# Patient Record
Sex: FEMALE | Race: WHITE | NOT HISPANIC OR LATINO | Employment: OTHER | URBAN - METROPOLITAN AREA
[De-identification: names, ages, dates, MRNs, and addresses within clinical notes are randomized per-mention and may not be internally consistent; named-entity substitution may affect disease eponyms.]

---

## 2018-06-11 ENCOUNTER — HOSPITAL ENCOUNTER (EMERGENCY)
Facility: HOSPITAL | Age: 43
Discharge: HOME | End: 2018-06-11
Attending: EMERGENCY MEDICINE

## 2018-06-11 VITALS
WEIGHT: 171 LBS | SYSTOLIC BLOOD PRESSURE: 151 MMHG | BODY MASS INDEX: 25.91 KG/M2 | TEMPERATURE: 99 F | DIASTOLIC BLOOD PRESSURE: 95 MMHG | OXYGEN SATURATION: 97 % | HEIGHT: 68 IN | HEART RATE: 82 BPM | RESPIRATION RATE: 18 BRPM

## 2018-06-11 DIAGNOSIS — T63.301A SPIDER BITE WOUND, ACCIDENTAL OR UNINTENTIONAL, INITIAL ENCOUNTER: Primary | ICD-10-CM

## 2018-06-11 LAB
ANION GAP SERPL CALC-SCNC: 7 MMOL/L
BASOPHILS # BLD AUTO: 0.03 K/UL
BASOPHILS NFR BLD: 0.4 %
BUN SERPL-MCNC: 9 MG/DL
CALCIUM SERPL-MCNC: 9.3 MG/DL
CHLORIDE SERPL-SCNC: 107 MMOL/L
CO2 SERPL-SCNC: 24 MMOL/L
CREAT SERPL-MCNC: 0.7 MG/DL
DEPRECATED S PYO AG THROAT QL EIA: NEGATIVE
DIFFERENTIAL METHOD: ABNORMAL
EOSINOPHIL # BLD AUTO: 0.3 K/UL
EOSINOPHIL NFR BLD: 3.7 %
ERYTHROCYTE [DISTWIDTH] IN BLOOD BY AUTOMATED COUNT: 12.9 %
EST. GFR  (AFRICAN AMERICAN): >60 ML/MIN/1.73 M^2
EST. GFR  (NON AFRICAN AMERICAN): >60 ML/MIN/1.73 M^2
GLUCOSE SERPL-MCNC: 100 MG/DL
HCT VFR BLD AUTO: 35.2 %
HGB BLD-MCNC: 11.8 G/DL
IMM GRANULOCYTES # BLD AUTO: 0.04 K/UL
IMM GRANULOCYTES NFR BLD AUTO: 0.6 %
LYMPHOCYTES # BLD AUTO: 2 K/UL
LYMPHOCYTES NFR BLD: 27.4 %
MCH RBC QN AUTO: 31.4 PG
MCHC RBC AUTO-ENTMCNC: 33.5 G/DL
MCV RBC AUTO: 94 FL
MONOCYTES # BLD AUTO: 0.5 K/UL
MONOCYTES NFR BLD: 6.6 %
NEUTROPHILS # BLD AUTO: 4.4 K/UL
NEUTROPHILS NFR BLD: 61.3 %
NRBC BLD-RTO: 0 /100 WBC
PLATELET # BLD AUTO: 244 K/UL
PMV BLD AUTO: 10.3 FL
POTASSIUM SERPL-SCNC: 4 MMOL/L
RBC # BLD AUTO: 3.76 M/UL
SODIUM SERPL-SCNC: 138 MMOL/L
WBC # BLD AUTO: 7.11 K/UL

## 2018-06-11 PROCEDURE — 85025 COMPLETE CBC W/AUTO DIFF WBC: CPT

## 2018-06-11 PROCEDURE — 87081 CULTURE SCREEN ONLY: CPT

## 2018-06-11 PROCEDURE — 99284 EMERGENCY DEPT VISIT MOD MDM: CPT

## 2018-06-11 PROCEDURE — 80048 BASIC METABOLIC PNL TOTAL CA: CPT

## 2018-06-11 PROCEDURE — 36415 COLL VENOUS BLD VENIPUNCTURE: CPT

## 2018-06-11 PROCEDURE — 87880 STREP A ASSAY W/OPTIC: CPT

## 2018-06-11 RX ORDER — PREDNISONE 10 MG/1
30 TABLET ORAL DAILY
Qty: 9 TABLET | Refills: 0 | Status: SHIPPED | OUTPATIENT
Start: 2018-06-11 | End: 2018-06-14

## 2018-06-11 RX ORDER — SULFAMETHOXAZOLE AND TRIMETHOPRIM 800; 160 MG/1; MG/1
1 TABLET ORAL 2 TIMES DAILY
Qty: 14 TABLET | Refills: 0 | Status: SHIPPED | OUTPATIENT
Start: 2018-06-11 | End: 2018-06-18

## 2018-06-11 RX ORDER — CLONAZEPAM 1 MG/1
1 TABLET ORAL 2 TIMES DAILY PRN
COMMUNITY
End: 2019-03-16

## 2018-06-11 RX ORDER — HYDROCODONE BITARTRATE AND ACETAMINOPHEN 5; 325 MG/1; MG/1
1-2 TABLET ORAL EVERY 6 HOURS PRN
Qty: 12 TABLET | Refills: 0 | Status: SHIPPED | OUTPATIENT
Start: 2018-06-11 | End: 2019-01-09

## 2018-06-11 NOTE — ED NOTES
"Pt sitting in ER bed 10 with c/o "spider bites" to R elbow and chest. Pt reports the bite occurred 3 days ago, she has been taking Keflex that she had left over from a prior prescription and Bactroban ointment. Pt now reports she has a productive cough, sore throat, and fever. On arrival to ER pt did not have a fever. Respirations equal and unlabored. Pt updated on plan of care. Will continue to monitor.   "

## 2018-06-12 NOTE — ED PROVIDER NOTES
Encounter Date: 6/11/2018       History     Chief Complaint   Patient presents with    Insect Bite     on right elbow and right upper chest     Two reported bites from spider after retrieving items from attic   Time of bite now 2-3 days ago  One right medial supratrochlear  One to the right of the sternal notch.    No fever  Reports ongoing pain significant in spite of basic care            Review of patient's allergies indicates:   Allergen Reactions    Tetracyclines Anaphylaxis    Vancomycin analogues Anaphylaxis    Iodine and iodide containing products     Morphine     Reglan [metoclopramide hcl]     Toradol [ketorolac]      Past Medical History:   Diagnosis Date    Anxiety      History reviewed. No pertinent surgical history.  History reviewed. No pertinent family history.  Social History   Substance Use Topics    Smoking status: Current Every Day Smoker    Smokeless tobacco: Never Used    Alcohol use No     Review of Systems   Constitutional: Negative.    Respiratory: Negative.    Cardiovascular: Negative.    Musculoskeletal: Negative.    Skin: Positive for wound. Negative for rash.   Hematological: Negative.    All other systems reviewed and are negative.      Physical Exam     Initial Vitals [06/11/18 1004]   BP Pulse Resp Temp SpO2   (!) 151/95 82 18 98.5 °F (36.9 °C) 97 %      MAP       113.67         Physical Exam    Nursing note and vitals reviewed.  Constitutional: She appears well-developed and well-nourished.   HENT:   Head: Normocephalic and atraumatic.   Mouth/Throat: Oropharynx is clear and moist.   Neck: Normal range of motion. Neck supple.   Cardiovascular: Normal rate, regular rhythm and normal heart sounds.   Pulmonary/Chest: Breath sounds normal.   Abdominal: There is no tenderness.   Musculoskeletal: Normal range of motion. She exhibits no edema or tenderness.   Skin: Skin is warm and dry. Capillary refill takes less than 2 seconds. No ecchymosis, no rash and no abscess noted. There  is erythema.        Psychiatric: She has a normal mood and affect.         ED Course   Procedures  Labs Reviewed   BASIC METABOLIC PANEL - Abnormal; Notable for the following:        Result Value    Anion Gap 7 (*)     All other components within normal limits   CBC W/ AUTO DIFFERENTIAL - Abnormal; Notable for the following:     RBC 3.76 (*)     Hemoglobin 11.8 (*)     Hematocrit 35.2 (*)     MCH 31.4 (*)     Immature Granulocytes 0.6 (*)     All other components within normal limits   THROAT SCREEN, RAPID   CULTURE, STREP A,  THROAT          No orders to display        Medical Decision Making:   Stable to DC as per AVS  Understands plan of care and if these represent recluse (not likely) that efforts to treat are typically unsuccessful  She has had prior recluse bite of the right pretibial area and understands difficulty in treating  She is agreeable to treat as per AVS                       Clinical Impression:   The encounter diagnosis was Spider bite wound, accidental or unintentional, initial encounter.                             Herbert José MD  06/12/18 2857

## 2018-06-13 LAB — BACTERIA THROAT CULT: NORMAL

## 2018-07-06 ENCOUNTER — HOSPITAL ENCOUNTER (EMERGENCY)
Facility: HOSPITAL | Age: 43
Discharge: HOME | End: 2018-07-06
Attending: FAMILY MEDICINE

## 2018-07-06 VITALS
HEIGHT: 68 IN | RESPIRATION RATE: 18 BRPM | DIASTOLIC BLOOD PRESSURE: 115 MMHG | OXYGEN SATURATION: 98 % | TEMPERATURE: 98 F | WEIGHT: 181 LBS | HEART RATE: 82 BPM | BODY MASS INDEX: 27.43 KG/M2 | SYSTOLIC BLOOD PRESSURE: 130 MMHG

## 2018-07-06 DIAGNOSIS — N93.8 DUB (DYSFUNCTIONAL UTERINE BLEEDING): Primary | ICD-10-CM

## 2018-07-06 LAB
ABO + RH BLD: NORMAL
ALBUMIN SERPL BCP-MCNC: 4 G/DL
ALP SERPL-CCNC: 54 U/L
ALT SERPL W/O P-5'-P-CCNC: 18 U/L
ANION GAP SERPL CALC-SCNC: 8 MMOL/L
AST SERPL-CCNC: 20 U/L
BASOPHILS # BLD AUTO: 0.03 K/UL
BASOPHILS NFR BLD: 0.4 %
BILIRUB SERPL-MCNC: 0.2 MG/DL
BLD GP AB SCN CELLS X3 SERPL QL: NORMAL
BUN SERPL-MCNC: 16 MG/DL
CALCIUM SERPL-MCNC: 9.4 MG/DL
CHLORIDE SERPL-SCNC: 109 MMOL/L
CO2 SERPL-SCNC: 23 MMOL/L
CREAT SERPL-MCNC: 0.6 MG/DL
DIFFERENTIAL METHOD: ABNORMAL
EOSINOPHIL # BLD AUTO: 0.3 K/UL
EOSINOPHIL NFR BLD: 4.3 %
ERYTHROCYTE [DISTWIDTH] IN BLOOD BY AUTOMATED COUNT: 13.4 %
EST. GFR  (AFRICAN AMERICAN): >60 ML/MIN/1.73 M^2
EST. GFR  (NON AFRICAN AMERICAN): >60 ML/MIN/1.73 M^2
GLUCOSE SERPL-MCNC: 96 MG/DL
HCT VFR BLD AUTO: 38.3 %
HGB BLD-MCNC: 12.9 G/DL
IMM GRANULOCYTES # BLD AUTO: 0.02 K/UL
IMM GRANULOCYTES NFR BLD AUTO: 0.3 %
INR PPP: 1
LYMPHOCYTES # BLD AUTO: 2.2 K/UL
LYMPHOCYTES NFR BLD: 29.1 %
MCH RBC QN AUTO: 31.3 PG
MCHC RBC AUTO-ENTMCNC: 33.7 G/DL
MCV RBC AUTO: 93 FL
MONOCYTES # BLD AUTO: 0.8 K/UL
MONOCYTES NFR BLD: 10.1 %
NEUTROPHILS # BLD AUTO: 4.2 K/UL
NEUTROPHILS NFR BLD: 55.8 %
NRBC BLD-RTO: 0 /100 WBC
PLATELET # BLD AUTO: 247 K/UL
PMV BLD AUTO: 10 FL
POTASSIUM SERPL-SCNC: 3.9 MMOL/L
PROT SERPL-MCNC: 7.3 G/DL
PROTHROMBIN TIME: 11.6 SEC
RBC # BLD AUTO: 4.12 M/UL
SODIUM SERPL-SCNC: 140 MMOL/L
WBC # BLD AUTO: 7.46 K/UL

## 2018-07-06 PROCEDURE — 36415 COLL VENOUS BLD VENIPUNCTURE: CPT

## 2018-07-06 PROCEDURE — 96372 THER/PROPH/DIAG INJ SC/IM: CPT

## 2018-07-06 PROCEDURE — 63600175 PHARM REV CODE 636 W HCPCS: Performed by: FAMILY MEDICINE

## 2018-07-06 PROCEDURE — 80053 COMPREHEN METABOLIC PANEL: CPT

## 2018-07-06 PROCEDURE — 86901 BLOOD TYPING SEROLOGIC RH(D): CPT

## 2018-07-06 PROCEDURE — 99283 EMERGENCY DEPT VISIT LOW MDM: CPT | Mod: 25

## 2018-07-06 PROCEDURE — 85025 COMPLETE CBC W/AUTO DIFF WBC: CPT

## 2018-07-06 PROCEDURE — 85610 PROTHROMBIN TIME: CPT

## 2018-07-06 RX ORDER — MEDROXYPROGESTERONE ACETATE 10 MG/1
10 TABLET ORAL DAILY
Qty: 10 TABLET | Refills: 0 | Status: SHIPPED | OUTPATIENT
Start: 2018-07-06 | End: 2019-03-16

## 2018-07-06 RX ORDER — PROMETHAZINE HYDROCHLORIDE 25 MG/ML
25 INJECTION, SOLUTION INTRAMUSCULAR; INTRAVENOUS
Status: COMPLETED | OUTPATIENT
Start: 2018-07-06 | End: 2018-07-06

## 2018-07-06 RX ORDER — HYDROMORPHONE HYDROCHLORIDE 2 MG/ML
1 INJECTION, SOLUTION INTRAMUSCULAR; INTRAVENOUS; SUBCUTANEOUS
Status: COMPLETED | OUTPATIENT
Start: 2018-07-06 | End: 2018-07-06

## 2018-07-06 RX ORDER — CLONIDINE HYDROCHLORIDE 0.1 MG/1
0.1 TABLET ORAL DAILY
COMMUNITY
End: 2020-08-12

## 2018-07-06 RX ORDER — ONDANSETRON 4 MG/1
4 TABLET, FILM COATED ORAL EVERY 6 HOURS
Qty: 12 TABLET | Refills: 0 | Status: SHIPPED | OUTPATIENT
Start: 2018-07-06 | End: 2019-03-16

## 2018-07-06 RX ADMIN — HYDROMORPHONE HYDROCHLORIDE 1 MG: 2 INJECTION, SOLUTION INTRAMUSCULAR; INTRAVENOUS; SUBCUTANEOUS at 09:07

## 2018-07-06 RX ADMIN — PROMETHAZINE HYDROCHLORIDE 25 MG: 25 INJECTION INTRAMUSCULAR; INTRAVENOUS at 09:07

## 2018-07-06 NOTE — ED PROVIDER NOTES
Encounter Date: 2018       History     Chief Complaint   Patient presents with    Vaginal Bleeding     Began approximately 15 days ago. States also passing larged sized blood clots.    Abdominal Pain     Complaint of constant pain to the bilateral lower abdominal quadrants. Describes pain to radiate into the pelvis and lower back. Describes pain to be a aching/cramping in nature.    Headache    Fatigue     Patient is a very pleasant 43-year-old lady with a history of fibromyalgia but no other medical problems.  She has had vaginal bleeding, that is quite heavy, passing clots for the past 15 days.  She states over the past 2 days she has started to become symptomatic with poor sleep, headache, fatigue and some mild shortness of breath and dyspnea on exertion.  She is typically a very active person.  She states her sister went into menopause at 42.          Review of patient's allergies indicates:   Allergen Reactions    Morphine Anaphylaxis    Tetracyclines Anaphylaxis    Vancomycin analogues Anaphylaxis    Iodine and iodide containing products Hives, Itching and Nausea And Vomiting    Toradol [ketorolac] Nausea And Vomiting and Hallucinations    Reglan [metoclopramide hcl] Rash     Past Medical History:   Diagnosis Date    Anxiety     Fibromyalgia     Migraine      Past Surgical History:   Procedure Laterality Date    APPENDECTOMY       SECTION      CHOLECYSTECTOMY      KNEE SURGERY Right     SHOULDER SURGERY Left      No family history on file.  Social History   Substance Use Topics    Smoking status: Current Every Day Smoker     Types: Vaping w/o nicotine    Smokeless tobacco: Never Used    Alcohol use Yes      Comment: Social     Review of Systems   Constitutional: Positive for fatigue. Negative for chills and fever.   HENT: Negative for congestion, ear pain, rhinorrhea, sinus pain, sinus pressure and sore throat.    Eyes: Negative for photophobia and redness.   Respiratory:  Positive for shortness of breath. Negative for cough and wheezing.    Cardiovascular: Negative for chest pain, palpitations and leg swelling.   Gastrointestinal: Negative for abdominal pain, constipation, diarrhea and nausea.   Endocrine: Negative for polydipsia, polyphagia and polyuria.   Genitourinary: Negative for difficulty urinating, dysuria, flank pain, hematuria and urgency.   Musculoskeletal: Negative for arthralgias, back pain and joint swelling.   Skin: Negative for color change.   Neurological: Positive for light-headedness. Negative for dizziness, seizures, syncope, weakness and headaches.   Hematological: Negative for adenopathy.   Psychiatric/Behavioral: Negative for sleep disturbance and suicidal ideas.   All other systems reviewed and are negative.      Physical Exam     Initial Vitals [07/06/18 0748]   BP Pulse Resp Temp SpO2   (!) 130/115 82 18 98.3 °F (36.8 °C) 98 %      MAP       --         Physical Exam    Nursing note and vitals reviewed.  Constitutional: Vital signs are normal. She appears well-developed and well-nourished.   HENT:   Head: Normocephalic and atraumatic.   Eyes: Lids are normal.   Neck: Trachea normal, normal range of motion and phonation normal. Neck supple.   Cardiovascular: Normal rate, regular rhythm, normal heart sounds, intact distal pulses and normal pulses.   Abdominal: Soft. Normal appearance and bowel sounds are normal.   Musculoskeletal: Normal range of motion.   Neurological: She is alert. GCS eye subscore is 4. GCS verbal subscore is 5. GCS motor subscore is 6.   Skin: Skin is warm, dry and intact. Capillary refill takes less than 2 seconds.   Psychiatric: She has a normal mood and affect. Her speech is normal and behavior is normal. Judgment and thought content normal. Cognition and memory are normal.         ED Course   Procedures  Labs Reviewed   CBC W/ AUTO DIFFERENTIAL   COMPREHENSIVE METABOLIC PANEL   PROTIME-INR   TYPE & SCREEN          Imaging Results     None          Medical Decision Making:   Clinical Tests:   Lab Tests: Ordered and Reviewed  The following lab test(s) were unremarkable: CBC, CMP, Urinalysis and PT  Labs are all unremarkable.  Will treat patient with Provera and pain medicine.                      Clinical Impression:   The encounter diagnosis was DUB (dysfunctional uterine bleeding).                             Ania Inman MD  07/07/18 0687

## 2018-12-14 ENCOUNTER — OCCUPATIONAL HEALTH (OUTPATIENT)
Dept: URGENT CARE | Facility: CLINIC | Age: 43
End: 2018-12-14

## 2018-12-14 DIAGNOSIS — Z02.83 ENCOUNTER FOR EMPLOYMENT-RELATED DRUG TESTING: ICD-10-CM

## 2018-12-14 PROCEDURE — 80305 DRUG TEST PRSMV DIR OPT OBS: CPT | Mod: S$GLB,,, | Performed by: EMERGENCY MEDICINE

## 2018-12-14 PROCEDURE — 82075 ASSAY OF BREATH ETHANOL: CPT | Mod: S$GLB,,, | Performed by: EMERGENCY MEDICINE

## 2018-12-29 ENCOUNTER — HOSPITAL ENCOUNTER (EMERGENCY)
Facility: HOSPITAL | Age: 43
Discharge: HOME | End: 2018-12-30
Attending: FAMILY MEDICINE

## 2018-12-29 DIAGNOSIS — J40 BRONCHITIS: Primary | ICD-10-CM

## 2018-12-29 DIAGNOSIS — R06.2 WHEEZES: ICD-10-CM

## 2018-12-29 PROCEDURE — 71046 XR CHEST PA AND LATERAL: ICD-10-PCS | Mod: 26,,, | Performed by: RADIOLOGY

## 2018-12-29 PROCEDURE — 96372 THER/PROPH/DIAG INJ SC/IM: CPT

## 2018-12-29 PROCEDURE — 25000242 PHARM REV CODE 250 ALT 637 W/ HCPCS: Performed by: NURSE PRACTITIONER

## 2018-12-29 PROCEDURE — 71046 X-RAY EXAM CHEST 2 VIEWS: CPT | Mod: TC,FY

## 2018-12-29 PROCEDURE — 71046 X-RAY EXAM CHEST 2 VIEWS: CPT | Mod: 26,,, | Performed by: RADIOLOGY

## 2018-12-29 PROCEDURE — 99284 EMERGENCY DEPT VISIT MOD MDM: CPT | Mod: 25

## 2018-12-29 RX ORDER — IPRATROPIUM BROMIDE AND ALBUTEROL SULFATE 2.5; .5 MG/3ML; MG/3ML
3 SOLUTION RESPIRATORY (INHALATION)
Status: COMPLETED | OUTPATIENT
Start: 2018-12-29 | End: 2018-12-29

## 2018-12-29 RX ORDER — IPRATROPIUM BROMIDE AND ALBUTEROL SULFATE 2.5; .5 MG/3ML; MG/3ML
SOLUTION RESPIRATORY (INHALATION)
Status: DISCONTINUED
Start: 2018-12-29 | End: 2018-12-30 | Stop reason: HOSPADM

## 2018-12-29 RX ADMIN — IPRATROPIUM BROMIDE AND ALBUTEROL SULFATE 3 ML: .5; 3 SOLUTION RESPIRATORY (INHALATION) at 11:12

## 2018-12-30 VITALS
SYSTOLIC BLOOD PRESSURE: 129 MMHG | DIASTOLIC BLOOD PRESSURE: 84 MMHG | RESPIRATION RATE: 16 BRPM | BODY MASS INDEX: 31.83 KG/M2 | TEMPERATURE: 98 F | WEIGHT: 210 LBS | HEART RATE: 77 BPM | HEIGHT: 68 IN | OXYGEN SATURATION: 95 %

## 2018-12-30 PROCEDURE — 63600175 PHARM REV CODE 636 W HCPCS: Performed by: FAMILY MEDICINE

## 2018-12-30 PROCEDURE — 94640 AIRWAY INHALATION TREATMENT: CPT

## 2018-12-30 PROCEDURE — 25000003 PHARM REV CODE 250: Performed by: FAMILY MEDICINE

## 2018-12-30 PROCEDURE — 25000242 PHARM REV CODE 250 ALT 637 W/ HCPCS: Performed by: FAMILY MEDICINE

## 2018-12-30 RX ORDER — METHYLPREDNISOLONE 4 MG/1
TABLET ORAL
Qty: 1 PACKAGE | Refills: 0 | Status: SHIPPED | OUTPATIENT
Start: 2018-12-30 | End: 2019-01-09

## 2018-12-30 RX ORDER — METHYLPREDNISOLONE SOD SUCC 125 MG
125 VIAL (EA) INJECTION
Status: DISCONTINUED | OUTPATIENT
Start: 2018-12-30 | End: 2018-12-30

## 2018-12-30 RX ORDER — BENZONATATE 100 MG/1
100 CAPSULE ORAL 3 TIMES DAILY PRN
Qty: 20 CAPSULE | Refills: 0 | Status: SHIPPED | OUTPATIENT
Start: 2018-12-30 | End: 2019-03-16

## 2018-12-30 RX ORDER — IBUPROFEN 400 MG/1
800 TABLET ORAL
Status: COMPLETED | OUTPATIENT
Start: 2018-12-30 | End: 2018-12-30

## 2018-12-30 RX ORDER — METHYLPREDNISOLONE SOD SUCC 125 MG
125 VIAL (EA) INJECTION
Status: COMPLETED | OUTPATIENT
Start: 2018-12-30 | End: 2018-12-30

## 2018-12-30 RX ORDER — ALBUTEROL SULFATE 90 UG/1
1-2 AEROSOL, METERED RESPIRATORY (INHALATION) EVERY 6 HOURS PRN
Qty: 1 INHALER | Refills: 0 | Status: SHIPPED | OUTPATIENT
Start: 2018-12-30 | End: 2019-03-16

## 2018-12-30 RX ORDER — IPRATROPIUM BROMIDE AND ALBUTEROL SULFATE 2.5; .5 MG/3ML; MG/3ML
3 SOLUTION RESPIRATORY (INHALATION)
Status: COMPLETED | OUTPATIENT
Start: 2018-12-30 | End: 2018-12-30

## 2018-12-30 RX ORDER — HYDROCODONE BITARTRATE AND ACETAMINOPHEN 5; 325 MG/1; MG/1
1 TABLET ORAL
Status: COMPLETED | OUTPATIENT
Start: 2018-12-30 | End: 2018-12-30

## 2018-12-30 RX ADMIN — HYDROCODONE BITARTRATE AND ACETAMINOPHEN 1 TABLET: 5; 325 TABLET ORAL at 03:12

## 2018-12-30 RX ADMIN — IBUPROFEN 800 MG: 400 TABLET ORAL at 04:12

## 2018-12-30 RX ADMIN — METHYLPREDNISOLONE SODIUM SUCCINATE 125 MG: 125 INJECTION, POWDER, FOR SOLUTION INTRAMUSCULAR; INTRAVENOUS at 03:12

## 2018-12-30 RX ADMIN — IPRATROPIUM BROMIDE AND ALBUTEROL SULFATE 3 ML: .5; 3 SOLUTION RESPIRATORY (INHALATION) at 03:12

## 2018-12-30 NOTE — ED PROVIDER NOTES
Encounter Date: 2018       History     Chief Complaint   Patient presents with    Cough    Nasal Congestion    Generalized Body Aches    Wheezing    Otalgia    Sore Throat    Back Pain    Headache    Nausea    Diarrhea    Bloated     Patient complains of coughing, shortness of breath and congestion for the past 3 months worse for the past couple weeks.  Presents tonight complaining that she cannot control her cough and she is suffering from headache unrelieved with over-the-counter medications.  Patient denies having a primary care provider, she relates this to not having any insurance currently.          Review of patient's allergies indicates:   Allergen Reactions    Morphine Anaphylaxis    Tetracyclines Anaphylaxis    Vancomycin analogues Anaphylaxis    Iodine and iodide containing products Hives, Itching and Nausea And Vomiting    Toradol [ketorolac] Nausea And Vomiting and Hallucinations    Reglan [metoclopramide hcl] Rash     Past Medical History:   Diagnosis Date    Anxiety     Fibromyalgia     Migraine      Past Surgical History:   Procedure Laterality Date    APPENDECTOMY       SECTION      CHOLECYSTECTOMY      KNEE SURGERY Right     SHOULDER SURGERY Left      No family history on file.  Social History     Tobacco Use    Smoking status: Current Every Day Smoker     Types: Vaping w/o nicotine    Smokeless tobacco: Never Used   Substance Use Topics    Alcohol use: Yes     Comment: Social    Drug use: No     Review of Systems   Constitutional: Negative for fever.   HENT: Negative for sore throat.    Respiratory: Positive for cough, shortness of breath and wheezing.    Cardiovascular: Negative for chest pain.   Gastrointestinal: Negative for nausea.   Genitourinary: Negative for dysuria.   Musculoskeletal: Negative for back pain.   Skin: Negative for rash.   Neurological: Positive for headaches. Negative for weakness.   Hematological: Does not bruise/bleed easily.        Physical Exam     Initial Vitals   BP Pulse Resp Temp SpO2   12/29/18 2318 12/29/18 2312 12/29/18 2312 12/29/18 2318 12/29/18 2312   (!) 136/95 88 20 98.4 °F (36.9 °C) 97 %      MAP       --                Physical Exam    Nursing note and vitals reviewed.  Constitutional: She appears well-developed and well-nourished. She is not diaphoretic. No distress.   HENT:   Head: Normocephalic and atraumatic.   Eyes: Conjunctivae and EOM are normal. Pupils are equal, round, and reactive to light.   Neck: Normal range of motion. Neck supple.   Cardiovascular: Normal rate, regular rhythm, normal heart sounds and intact distal pulses. Exam reveals no gallop and no friction rub.    No murmur heard.  Pulmonary/Chest: No respiratory distress. She has wheezes. She has no rales.   Mild expiratory wheezes in upper lobes.   Abdominal: Soft. Bowel sounds are normal. She exhibits no distension. There is no tenderness.   Musculoskeletal: Normal range of motion. She exhibits no edema.   Neurological: She is alert and oriented to person, place, and time. She has normal strength.   Skin: Skin is warm and dry. Capillary refill takes less than 2 seconds. No rash noted. No erythema.   Psychiatric: She has a normal mood and affect. Her behavior is normal. Judgment and thought content normal.         ED Course   Procedures  Labs Reviewed - No data to display       Imaging Results          X-Ray Chest PA And Lateral (In process)                  Medical Decision Making:   ED Management:  Lung sounds clear after 2nd nebulizer treatment, patient specifically asked the nurse for Dilaudid for pain, it was explained to patient that this was not appropriate to to her complaint of headache for 2 weeks and cough.  Patient then states I do really well with tramadol to.  I explained to patient that tramadol is a medication best managed on an outpatient basis by primary care provider.  I encouraged patient to continue taking ibuprofen 800 mg that  she has at home and will provide her with a dose before she leaves.                   ED Course as of Dec 30 0422   Sun Dec 30, 2018   0234 Chest x-ray shows no evidence effusion or infiltrate.  Normal cardiac silhouette.  No bony abnormalities identified. Normal chest x-ray.  [MD]      ED Course User Index  [MD] Altagracia Somers MD     Clinical Impression:   The primary encounter diagnosis was Bronchitis. A diagnosis of Wheezes was also pertinent to this visit.                             Altagracia Somers MD  12/30/18 0425

## 2018-12-30 NOTE — DISCHARGE INSTRUCTIONS
Take medications as directed.  Return to the ER at any time if condition changes or worsens or new symptoms develop.  Follow-up with primary care provider in the next 2-3 days for recheck.  Avoid irritants such as cigarette smoke and dust.  May use cool mist humidification at bedside to increase moisture in your upper airway.  Use inhaler every 4-6 hours as needed for wheezing or cough.

## 2018-12-30 NOTE — ED TRIAGE NOTES
Patient with multiple bodily complaints x 3 months. Symptoms worse over the past 2 weeks. Patient's initial  complaint of wheezing. Patient continued to add complaints during triage process. Vital signs WNL.

## 2019-01-09 ENCOUNTER — HOSPITAL ENCOUNTER (EMERGENCY)
Facility: HOSPITAL | Age: 44
Discharge: HOME | End: 2019-01-09

## 2019-01-09 VITALS
BODY MASS INDEX: 34.25 KG/M2 | DIASTOLIC BLOOD PRESSURE: 92 MMHG | SYSTOLIC BLOOD PRESSURE: 176 MMHG | HEIGHT: 68 IN | TEMPERATURE: 99 F | HEART RATE: 97 BPM | RESPIRATION RATE: 18 BRPM | WEIGHT: 226 LBS | OXYGEN SATURATION: 100 %

## 2019-01-09 DIAGNOSIS — J40 BRONCHITIS: Primary | ICD-10-CM

## 2019-01-09 PROCEDURE — 25000003 PHARM REV CODE 250: Performed by: NURSE PRACTITIONER

## 2019-01-09 PROCEDURE — 94640 AIRWAY INHALATION TREATMENT: CPT

## 2019-01-09 PROCEDURE — 94761 N-INVAS EAR/PLS OXIMETRY MLT: CPT

## 2019-01-09 PROCEDURE — 99283 EMERGENCY DEPT VISIT LOW MDM: CPT | Mod: 25

## 2019-01-09 PROCEDURE — 25000242 PHARM REV CODE 250 ALT 637 W/ HCPCS: Performed by: NURSE PRACTITIONER

## 2019-01-09 RX ORDER — IPRATROPIUM BROMIDE AND ALBUTEROL SULFATE 2.5; .5 MG/3ML; MG/3ML
3 SOLUTION RESPIRATORY (INHALATION)
Status: COMPLETED | OUTPATIENT
Start: 2019-01-09 | End: 2019-01-09

## 2019-01-09 RX ORDER — TRAMADOL HYDROCHLORIDE 50 MG/1
50 TABLET ORAL
Status: COMPLETED | OUTPATIENT
Start: 2019-01-09 | End: 2019-01-09

## 2019-01-09 RX ORDER — PROMETHAZINE HYDROCHLORIDE AND DEXTROMETHORPHAN HYDROBROMIDE 6.25; 15 MG/5ML; MG/5ML
5 SYRUP ORAL 4 TIMES DAILY PRN
Qty: 180 ML | Refills: 0 | Status: SHIPPED | OUTPATIENT
Start: 2019-01-09 | End: 2019-01-19

## 2019-01-09 RX ADMIN — IPRATROPIUM BROMIDE AND ALBUTEROL SULFATE 3 ML: .5; 3 SOLUTION RESPIRATORY (INHALATION) at 05:01

## 2019-01-09 RX ADMIN — TRAMADOL HYDROCHLORIDE 50 MG: 50 TABLET, COATED ORAL at 05:01

## 2019-01-09 NOTE — ED NOTES
Pt co of constant headache that wont go away. Also gags after coughing so hard. Denies any color in mucous.

## 2019-01-09 NOTE — ED NOTES
Pt to discharge with steady gait. Verbalized understanding of medication and discharge instructions. Escorted to discharge window.

## 2019-01-09 NOTE — ED PROVIDER NOTES
Encounter Date: 2019       History     Chief Complaint   Patient presents with    Cough     seen on 18 for same    Nasal Congestion     Kristin Min is a 43 y.o female with sign PMHx including anxiety, fibromyalgia and migraine. She presents to ED with worsen cough  Patient seen in ED on . She was Normal CBC and CXR. Dx with Bronchitis and discharged home with Albuterol inhaler, Tessalon Perles and Medrol dosepak  Patient had a old prescription for 10 day course of Augmentin that she just completed  No fever          Review of patient's allergies indicates:   Allergen Reactions    Morphine Anaphylaxis    Tetracyclines Anaphylaxis    Vancomycin analogues Anaphylaxis    Iodine and iodide containing products Hives, Itching and Nausea And Vomiting    Toradol [ketorolac] Nausea And Vomiting and Hallucinations    Reglan [metoclopramide hcl] Rash     Past Medical History:   Diagnosis Date    Anxiety     Fibromyalgia     Migraine      Past Surgical History:   Procedure Laterality Date    APPENDECTOMY       SECTION      CHOLECYSTECTOMY      KNEE SURGERY Right     SHOULDER SURGERY Left      No family history on file.  Social History     Tobacco Use    Smoking status: Current Every Day Smoker     Types: Vaping w/o nicotine    Smokeless tobacco: Never Used   Substance Use Topics    Alcohol use: Yes     Comment: Social    Drug use: No     Review of Systems   Constitutional: Negative for fever.   HENT: Positive for sore throat. Negative for congestion, ear discharge, ear pain, facial swelling, sinus pressure and sinus pain.    Respiratory: Positive for cough and shortness of breath.    Cardiovascular: Negative for chest pain.   Gastrointestinal: Negative for nausea.   Genitourinary: Negative for dysuria.   Musculoskeletal: Negative for back pain.   Skin: Negative for rash.   Neurological: Negative for weakness.   Hematological: Does not bruise/bleed easily.   All other systems  reviewed and are negative.      Physical Exam     Initial Vitals [01/09/19 1701]   BP Pulse Resp Temp SpO2   (!) 176/92 98 20 98.8 °F (37.1 °C) 97 %      MAP       --         Physical Exam    Nursing note and vitals reviewed.  Constitutional: She appears well-developed and well-nourished.   HENT:   Head: Normocephalic.   Eyes: Pupils are equal, round, and reactive to light.   Neck: Normal range of motion.   Cardiovascular: Normal rate, regular rhythm and normal heart sounds.   Pulmonary/Chest: She has wheezes (occasional faint wheeze).   Musculoskeletal: Normal range of motion.   Neurological: She is alert and oriented to person, place, and time.   Skin: Skin is warm and dry.   Psychiatric: She has a normal mood and affect. Her behavior is normal. Judgment and thought content normal.         ED Course   Procedures  Labs Reviewed - No data to display       Imaging Results    None          Medical Decision Making:   Initial Assessment:   Patient with worsen dry cough  Patient seen in ED on 12/29. She was Normal CBC and CXR. Dx with Bronchitis and discharged home with Albuterol inhaler, Tessalon Perles and Medrol dosepak  Patient had a old prescription for 10 day course of Augmentin that she just completed    Tessalon Perles not treating cough. Cough is worse at night and affecting sleep    Differential Diagnosis:   Pneumonia  Bronchitis  Sinusitis  Other viral illness  ED Management:  Resp treatment admin.    Ultram for generalized pain and headache. Patient took Motrin 2 hours prior to arrival    Discussed physical exam findings with patient  No acute emergent medical condition identified at this time to warrant further testing/diagnostics.  Plan to discharge patient home with instructions per AVS.  Follow-up with PCP in 2-5 days or return to ED if symptoms worsen.  Patient verbalized agreement to discharge treatment plan.                      Clinical Impression:   The encounter diagnosis was Bronchitis.                              Shirley Henderson, KEESHA  01/09/19 7903

## 2019-02-01 DIAGNOSIS — R53.81 MALAISE: ICD-10-CM

## 2019-02-01 DIAGNOSIS — Z80.1 FAMILY HISTORY OF LUNG CANCER: ICD-10-CM

## 2019-02-01 DIAGNOSIS — F17.200 SMOKER: ICD-10-CM

## 2019-02-01 DIAGNOSIS — D64.9 ANEMIA: ICD-10-CM

## 2019-02-01 DIAGNOSIS — R09.89 CHEST CONGESTION: Primary | ICD-10-CM

## 2019-02-01 DIAGNOSIS — J34.89 SINUS PAIN: Primary | ICD-10-CM

## 2019-02-01 DIAGNOSIS — R09.89 CHEST CONGESTION: ICD-10-CM

## 2019-02-07 ENCOUNTER — PROCEDURE VISIT (OUTPATIENT)
Dept: RESPIRATORY THERAPY | Facility: HOSPITAL | Age: 44
End: 2019-02-07
Attending: FAMILY MEDICINE
Payer: COMMERCIAL

## 2019-02-07 DIAGNOSIS — Z80.1 FAMILY HISTORY OF LUNG CANCER: ICD-10-CM

## 2019-02-07 DIAGNOSIS — R09.89 CHEST CONGESTION: ICD-10-CM

## 2019-02-07 DIAGNOSIS — R53.81 MALAISE: ICD-10-CM

## 2019-02-07 DIAGNOSIS — F17.200 SMOKER: ICD-10-CM

## 2019-02-07 DIAGNOSIS — D64.9 ANEMIA: ICD-10-CM

## 2019-02-07 PROCEDURE — 94727 GAS DIL/WSHOT DETER LNG VOL: CPT

## 2019-02-07 PROCEDURE — 94729 DIFFUSING CAPACITY: CPT

## 2019-02-07 PROCEDURE — 25000242 PHARM REV CODE 250 ALT 637 W/ HCPCS

## 2019-02-07 PROCEDURE — 94375 RESPIRATORY FLOW VOLUME LOOP: CPT

## 2019-02-07 PROCEDURE — 94760 N-INVAS EAR/PLS OXIMETRY 1: CPT

## 2019-02-07 PROCEDURE — 94060 EVALUATION OF WHEEZING: CPT

## 2019-02-07 RX ORDER — ALBUTEROL SULFATE 0.83 MG/ML
2.5 SOLUTION RESPIRATORY (INHALATION) EVERY 4 HOURS PRN
Status: DISCONTINUED | OUTPATIENT
Start: 2019-02-07 | End: 2019-03-16

## 2019-02-07 RX ADMIN — ALBUTEROL SULFATE 2.5 MG: 2.5 SOLUTION RESPIRATORY (INHALATION) at 03:02

## 2019-02-08 ENCOUNTER — HOSPITAL ENCOUNTER (OUTPATIENT)
Dept: RADIOLOGY | Facility: HOSPITAL | Age: 44
Discharge: HOME | End: 2019-02-08
Attending: FAMILY MEDICINE
Payer: COMMERCIAL

## 2019-02-08 DIAGNOSIS — D64.9 ANEMIA: ICD-10-CM

## 2019-02-08 DIAGNOSIS — F17.200 SMOKER: ICD-10-CM

## 2019-02-08 DIAGNOSIS — Z80.1 FAMILY HISTORY OF LUNG CANCER: ICD-10-CM

## 2019-02-08 DIAGNOSIS — R09.89 CHEST CONGESTION: ICD-10-CM

## 2019-02-08 DIAGNOSIS — R53.81 MALAISE: ICD-10-CM

## 2019-02-08 LAB
BRPFT: ABNORMAL
DLCO ADJ PRE: 18.71 ML/(MIN*MMHG) (ref 21.76–33.22)
DLCO SINGLE BREATH LLN: 21.76
DLCO SINGLE BREATH PRE REF: 68.1 %
DLCO SINGLE BREATH REF: 27.49
DLCOC SBVA LLN: 3.55
DLCOC SBVA PRE REF: 88.1 %
DLCOC SBVA REF: 4.88
DLCOC SINGLE BREATH LLN: 21.76
DLCOC SINGLE BREATH PRE REF: 68.1 %
DLCOC SINGLE BREATH REF: 27.49
DLCOVA LLN: 3.55
DLCOVA PRE REF: 88.1 %
DLCOVA PRE: 4.3 ML/(MIN*MMHG*L) (ref 3.55–6.22)
DLCOVA REF: 4.88
DLVAADJ PRE: 4.3 ML/(MIN*MMHG*L) (ref 3.55–6.22)
ERVN2 LLN: 1.07
ERVN2 PRE REF: 103.7 %
ERVN2 PRE: 1.11 L (ref 1.07–1.07)
ERVN2 REF: 1.07
FEF 25 75 CHG: -1.7 %
FEF 25 75 LLN: 1.86
FEF 25 75 POST REF: 100.6 %
FEF 25 75 PRE REF: 102.3 %
FEF 25 75 REF: 3.19
FET100 CHG: 21.7 %
FEV1 CHG: -1.5 %
FEV1 FVC CHG: -1.3 %
FEV1 FVC LLN: 70
FEV1 FVC POST REF: 96.4 %
FEV1 FVC PRE REF: 97.7 %
FEV1 FVC REF: 81
FEV1 LLN: 2.58
FEV1 POST REF: 107.7 %
FEV1 PRE REF: 109.4 %
FEV1 REF: 3.28
FEV6 CHG: -2 %
FEV6 LLN: 3.28
FEV6 POST REF: 109.1 %
FEV6 POST: 4.42 L (ref 3.28–4.82)
FEV6 PRE REF: 111.3 %
FEV6 PRE: 4.51 L (ref 3.28–4.82)
FEV6 REF: 4.05
FRCN2 LLN: 2.1
FRCN2 PRE REF: 73.5 %
FRCN2 REF: 2.92
FVC CHG: -0.2 %
FVC LLN: 3.24
FVC POST REF: 110.9 %
FVC PRE REF: 111.1 %
FVC REF: 4.1
IVC PRE: 3.31 L (ref 3.24–4.95)
IVC SINGLE BREATH LLN: 3.24
IVC SINGLE BREATH PRE REF: 80.7 %
IVC SINGLE BREATH REF: 4.1
MVV LLN: 106
MVV PRE REF: 73.2 %
MVV REF: 124
PEF CHG: -11.3 %
PEF LLN: 5.59
PEF POST REF: 102.1 %
PEF PRE REF: 115.1 %
PEF REF: 7.53
POST FEF 25 75: 3.21 L/S (ref 1.86–4.52)
POST FET 100: 10.02 SEC
POST FEV1 FVC: 77.74 % (ref 69.71–91.54)
POST FEV1: 3.53 L (ref 2.58–3.97)
POST FVC: 4.54 L (ref 3.24–4.95)
POST PEF: 7.69 L/S (ref 5.59–9.47)
PRE DLCO: 18.71 ML/(MIN*MMHG) (ref 21.76–33.22)
PRE FEF 25 75: 3.26 L/S (ref 1.86–4.52)
PRE FET 100: 8.23 SEC
PRE FEV1 FVC: 78.79 % (ref 69.71–91.54)
PRE FEV1: 3.58 L (ref 2.58–3.97)
PRE FRC N2: 2.15 L
PRE FVC: 4.55 L (ref 3.24–4.95)
PRE MVV: 91 L/MIN (ref 105.69–142.99)
PRE PEF: 8.67 L/S (ref 5.59–9.47)
RVN2 LLN: 1.28
RVN2 PRE REF: 55.9 %
RVN2 PRE: 1.03 L (ref 1.28–2.43)
RVN2 REF: 1.85
RVN2TLCN2 LLN: 24.67
RVN2TLCN2 PRE REF: 54.1 %
RVN2TLCN2 PRE: 18.54 % (ref 24.67–43.85)
RVN2TLCN2 REF: 34.26
TLCN2 LLN: 4.64
TLCN2 PRE REF: 99.2 %
TLCN2 PRE: 5.58 L (ref 4.64–6.62)
TLCN2 REF: 5.63
VA PRE: 4.37 L (ref 5.48–5.48)
VA SINGLE BREATH LLN: 5.48
VA SINGLE BREATH PRE REF: 79.7 %
VA SINGLE BREATH REF: 5.48
VCMAXN2 LLN: 3.24
VCMAXN2 PRE REF: 111.1 %
VCMAXN2 PRE: 4.55 L (ref 3.24–4.95)
VCMAXN2 REF: 4.1

## 2019-02-08 PROCEDURE — 71275 CT ANGIOGRAPHY CHEST: CPT | Mod: TC

## 2019-02-08 PROCEDURE — 71275 CT ANGIOGRAPHY CHEST: CPT | Mod: 26,,, | Performed by: RADIOLOGY

## 2019-02-08 PROCEDURE — 71275 CTA CHEST NON CORONARY: ICD-10-PCS | Mod: 26,,, | Performed by: RADIOLOGY

## 2019-02-08 PROCEDURE — 25500020 PHARM REV CODE 255

## 2019-02-08 RX ADMIN — IOHEXOL 100 ML: 350 INJECTION, SOLUTION INTRAVENOUS at 12:02

## 2019-03-16 ENCOUNTER — HOSPITAL ENCOUNTER (EMERGENCY)
Facility: HOSPITAL | Age: 44
Discharge: HOME | End: 2019-03-16
Payer: COMMERCIAL

## 2019-03-16 DIAGNOSIS — S43.431A SUPERIOR GLENOID LABRUM LESION OF RIGHT SHOULDER, INITIAL ENCOUNTER: Primary | ICD-10-CM

## 2019-03-16 DIAGNOSIS — M25.519 ANTERIOR SHOULDER PAIN: ICD-10-CM

## 2019-03-16 PROCEDURE — 73030 X-RAY EXAM OF SHOULDER: CPT | Mod: TC,FY,RT

## 2019-03-16 PROCEDURE — 63600175 PHARM REV CODE 636 W HCPCS: Performed by: NURSE PRACTITIONER

## 2019-03-16 PROCEDURE — 25000003 PHARM REV CODE 250: Performed by: NURSE PRACTITIONER

## 2019-03-16 PROCEDURE — 96372 THER/PROPH/DIAG INJ SC/IM: CPT

## 2019-03-16 PROCEDURE — 99284 EMERGENCY DEPT VISIT MOD MDM: CPT | Mod: 25

## 2019-03-16 PROCEDURE — 73030 XR SHOULDER COMPLETE 2 OR MORE VIEWS RIGHT: ICD-10-PCS | Mod: 26,RT,, | Performed by: RADIOLOGY

## 2019-03-16 PROCEDURE — 73030 X-RAY EXAM OF SHOULDER: CPT | Mod: 26,RT,, | Performed by: RADIOLOGY

## 2019-03-16 RX ORDER — IBUPROFEN 400 MG/1
800 TABLET ORAL
Status: COMPLETED | OUTPATIENT
Start: 2019-03-16 | End: 2019-03-16

## 2019-03-16 RX ORDER — MEPERIDINE HYDROCHLORIDE 50 MG/ML
25 INJECTION INTRAMUSCULAR; INTRAVENOUS; SUBCUTANEOUS
Status: COMPLETED | OUTPATIENT
Start: 2019-03-16 | End: 2019-03-16

## 2019-03-16 RX ORDER — ORPHENADRINE CITRATE 30 MG/ML
60 INJECTION INTRAMUSCULAR; INTRAVENOUS
Status: COMPLETED | OUTPATIENT
Start: 2019-03-16 | End: 2019-03-16

## 2019-03-16 RX ORDER — IBUPROFEN 800 MG/1
800 TABLET ORAL EVERY 6 HOURS PRN
Qty: 20 TABLET | Refills: 0 | Status: SHIPPED | OUTPATIENT
Start: 2019-03-16 | End: 2020-09-01 | Stop reason: CLARIF

## 2019-03-16 RX ADMIN — MEPERIDINE HYDROCHLORIDE 25 MG: 50 INJECTION INTRAMUSCULAR; INTRAVENOUS; SUBCUTANEOUS at 03:03

## 2019-03-16 RX ADMIN — ORPHENADRINE CITRATE 60 MG: 30 INJECTION INTRAMUSCULAR; INTRAVENOUS at 02:03

## 2019-03-16 RX ADMIN — IBUPROFEN 800 MG: 400 TABLET ORAL at 01:03

## 2019-03-16 NOTE — ED PROVIDER NOTES
Encounter Date: 3/16/2019       History     Chief Complaint   Patient presents with    Shoulder Pain     right, heavy lifting yesterday     ambulatory to ED with R anterior shoulder pain and exquisite point tenderness over coracoid process after moving furniture yesterday.  States she felt fine last evening but awoke this morning with the tenderness which she states radiates down into her elbow.            Review of patient's allergies indicates:   Allergen Reactions    Morphine Anaphylaxis    Tetracyclines Anaphylaxis    Vancomycin analogues Anaphylaxis    Iodine and iodide containing products Hives, Itching and Nausea And Vomiting    Toradol [ketorolac] Nausea And Vomiting and Hallucinations    Reglan [metoclopramide hcl] Rash     Past Medical History:   Diagnosis Date    Anxiety     Fibromyalgia     Migraine      Past Surgical History:   Procedure Laterality Date    APPENDECTOMY       SECTION      CHOLECYSTECTOMY      KNEE SURGERY Right     SHOULDER SURGERY Left      History reviewed. No pertinent family history.  Social History     Tobacco Use    Smoking status: Current Every Day Smoker     Types: Vaping w/o nicotine    Smokeless tobacco: Never Used   Substance Use Topics    Alcohol use: Yes     Comment: Social    Drug use: No     Review of Systems   Constitutional: Negative.    HENT: Negative.    Eyes: Negative.    Respiratory: Negative.    Cardiovascular: Negative.    Gastrointestinal: Negative.    Endocrine: Negative.    Genitourinary: Negative.    Musculoskeletal: Positive for arthralgias, myalgias and neck pain.        Pain and tenderness as described, pain worse with abduction.     Skin: Negative.    Neurological: Negative.    Hematological: Negative.    Psychiatric/Behavioral: Negative.    All other systems reviewed and are negative.      Physical Exam     Initial Vitals   BP Pulse Resp Temp SpO2   -- -- -- -- --      MAP       --         Physical Exam    Constitutional: She  "appears well-developed and well-nourished.   HENT:   Head: Normocephalic and atraumatic.   Neck: Normal range of motion. Neck supple.   Tenderness with hyperotonicity to R trapezius   Cardiovascular: Normal rate, regular rhythm, normal heart sounds and intact distal pulses.   Pulmonary/Chest: Breath sounds normal.   Abdominal: Soft. Bowel sounds are normal.   Musculoskeletal: She exhibits tenderness.   Tenderness to light palpation of R coracoid process.     Skin: Skin is warm and dry. Capillary refill takes 2 to 3 seconds.   Psychiatric: She has a normal mood and affect. Her behavior is normal. Thought content normal.         ED Course   Procedures  Labs Reviewed - No data to display       Imaging Results    None          Medical Decision Making:   Initial Assessment:   Tender mass to anterior R shoulder at coracoid process, states it was not there last evening, denies identifiable mechanism of injury other than heavy lifting, states she heard a "pop' when lifting a box but felt no pain at the time.     Differential Diagnosis:   Bicepital tendonitis, coracoid injury, SLAP lesion.   Clinical Tests:   Radiological Study: Ordered  ED Management:  Given Ibuprofen 800 and norflex injection in ED.  Currently her pain is 7/10.  I will give her Demerol 25 mg IM and DC her with motrin 800 TID and referral to Ortho for further workup.  She was offered a sling but declined.                        Clinical Impression:       ICD-10-CM ICD-9-CM   1. Superior glenoid labrum lesion of right shoulder, initial encounter S43.431A 840.7   2. Anterior shoulder pain M25.519 719.41                                Stanton Paul NP  03/16/19 3782    "

## 2019-03-16 NOTE — DISCHARGE INSTRUCTIONS
Ice 5 min every 4 hours, gentle range of motion exercises, motrin 3 times a day for 3 days then as needed.  Follow up with Dr Francois or the orthopedist of your choice on Monday.

## 2019-08-13 ENCOUNTER — HOSPITAL ENCOUNTER (EMERGENCY)
Facility: HOSPITAL | Age: 44
Discharge: HOME | End: 2019-08-13
Attending: EMERGENCY MEDICINE
Payer: COMMERCIAL

## 2019-08-13 VITALS
DIASTOLIC BLOOD PRESSURE: 76 MMHG | BODY MASS INDEX: 34.25 KG/M2 | TEMPERATURE: 98 F | OXYGEN SATURATION: 100 % | WEIGHT: 226 LBS | HEART RATE: 84 BPM | RESPIRATION RATE: 20 BRPM | HEIGHT: 68 IN | SYSTOLIC BLOOD PRESSURE: 156 MMHG

## 2019-08-13 DIAGNOSIS — R10.9 LEFT FLANK PAIN: Primary | ICD-10-CM

## 2019-08-13 LAB
ALBUMIN SERPL BCP-MCNC: 4.7 G/DL (ref 3.5–5.2)
ALP SERPL-CCNC: 95 U/L (ref 55–135)
ALT SERPL W/O P-5'-P-CCNC: 47 U/L (ref 10–44)
ANION GAP SERPL CALC-SCNC: 14 MMOL/L (ref 8–16)
AST SERPL-CCNC: 43 U/L (ref 10–40)
BACTERIA #/AREA URNS HPF: ABNORMAL /HPF
BASOPHILS # BLD AUTO: 0.02 K/UL (ref 0–0.2)
BASOPHILS NFR BLD: 0.2 % (ref 0–1.9)
BILIRUB SERPL-MCNC: 0.7 MG/DL (ref 0.1–1)
BILIRUB UR QL STRIP: NEGATIVE
BUN SERPL-MCNC: 9 MG/DL (ref 6–20)
CALCIUM SERPL-MCNC: 11.1 MG/DL (ref 8.7–10.5)
CHLORIDE SERPL-SCNC: 107 MMOL/L (ref 95–110)
CLARITY UR: CLEAR
CO2 SERPL-SCNC: 17 MMOL/L (ref 23–29)
COLOR UR: YELLOW
CREAT SERPL-MCNC: 0.9 MG/DL (ref 0.5–1.4)
DIFFERENTIAL METHOD: ABNORMAL
EOSINOPHIL # BLD AUTO: 0.4 K/UL (ref 0–0.5)
EOSINOPHIL NFR BLD: 3.9 % (ref 0–8)
ERYTHROCYTE [DISTWIDTH] IN BLOOD BY AUTOMATED COUNT: 14.3 % (ref 11.5–14.5)
EST. GFR  (AFRICAN AMERICAN): >60 ML/MIN/1.73 M^2
EST. GFR  (NON AFRICAN AMERICAN): >60 ML/MIN/1.73 M^2
GLUCOSE SERPL-MCNC: 141 MG/DL (ref 70–110)
GLUCOSE UR QL STRIP: NEGATIVE
HCT VFR BLD AUTO: 42.6 % (ref 37–48.5)
HGB BLD-MCNC: 13.7 G/DL (ref 12–16)
HGB UR QL STRIP: ABNORMAL
HYALINE CASTS #/AREA URNS LPF: 1 /LPF
IMM GRANULOCYTES # BLD AUTO: 0.02 K/UL (ref 0–0.04)
KETONES UR QL STRIP: NEGATIVE
LEUKOCYTE ESTERASE UR QL STRIP: NEGATIVE
LIPASE SERPL-CCNC: 28 U/L (ref 4–60)
LYMPHOCYTES # BLD AUTO: 2.6 K/UL (ref 1–4.8)
LYMPHOCYTES NFR BLD: 25.2 % (ref 18–48)
MCH RBC QN AUTO: 28.8 PG (ref 27–31)
MCHC RBC AUTO-ENTMCNC: 32.2 G/DL (ref 32–36)
MCV RBC AUTO: 90 FL (ref 82–98)
MICROSCOPIC COMMENT: ABNORMAL
MONOCYTES # BLD AUTO: 0.7 K/UL (ref 0.3–1)
MONOCYTES NFR BLD: 7 % (ref 4–15)
NEUTROPHILS # BLD AUTO: 6.5 K/UL (ref 1.8–7.7)
NEUTROPHILS NFR BLD: 63.5 % (ref 38–73)
NITRITE UR QL STRIP: NEGATIVE
NRBC BLD-RTO: 0 /100 WBC
OTHER ELEMENTS URNS MICRO: ABNORMAL
PH UR STRIP: 7 [PH] (ref 5–8)
PLATELET # BLD AUTO: 428 K/UL (ref 150–350)
PMV BLD AUTO: 10.3 FL (ref 9.2–12.9)
POTASSIUM SERPL-SCNC: 4.5 MMOL/L (ref 3.5–5.1)
PROT SERPL-MCNC: 8.6 G/DL (ref 6–8.4)
PROT UR QL STRIP: NEGATIVE
RBC # BLD AUTO: 4.76 M/UL (ref 4–5.4)
RBC #/AREA URNS HPF: 3 /HPF (ref 0–4)
SODIUM SERPL-SCNC: 138 MMOL/L (ref 136–145)
SP GR UR STRIP: 1.01 (ref 1–1.03)
SQUAMOUS #/AREA URNS HPF: 10 /HPF
URN SPEC COLLECT METH UR: ABNORMAL
UROBILINOGEN UR STRIP-ACNC: NEGATIVE EU/DL
WBC # BLD AUTO: 10.3 K/UL (ref 3.9–12.7)
WBC #/AREA URNS HPF: 1 /HPF (ref 0–5)

## 2019-08-13 PROCEDURE — 25000003 PHARM REV CODE 250: Performed by: EMERGENCY MEDICINE

## 2019-08-13 PROCEDURE — 99284 EMERGENCY DEPT VISIT MOD MDM: CPT | Mod: 25

## 2019-08-13 PROCEDURE — 36415 COLL VENOUS BLD VENIPUNCTURE: CPT

## 2019-08-13 PROCEDURE — 80053 COMPREHEN METABOLIC PANEL: CPT

## 2019-08-13 PROCEDURE — 63600175 PHARM REV CODE 636 W HCPCS: Performed by: PHYSICIAN ASSISTANT

## 2019-08-13 PROCEDURE — 63600175 PHARM REV CODE 636 W HCPCS: Performed by: EMERGENCY MEDICINE

## 2019-08-13 PROCEDURE — 96361 HYDRATE IV INFUSION ADD-ON: CPT

## 2019-08-13 PROCEDURE — 81000 URINALYSIS NONAUTO W/SCOPE: CPT

## 2019-08-13 PROCEDURE — 96375 TX/PRO/DX INJ NEW DRUG ADDON: CPT

## 2019-08-13 PROCEDURE — 85025 COMPLETE CBC W/AUTO DIFF WBC: CPT

## 2019-08-13 PROCEDURE — 83690 ASSAY OF LIPASE: CPT

## 2019-08-13 PROCEDURE — 96365 THER/PROPH/DIAG IV INF INIT: CPT

## 2019-08-13 RX ORDER — ONDANSETRON 2 MG/ML
4 INJECTION INTRAMUSCULAR; INTRAVENOUS
Status: COMPLETED | OUTPATIENT
Start: 2019-08-13 | End: 2019-08-13

## 2019-08-13 RX ORDER — METOCLOPRAMIDE HYDROCHLORIDE 5 MG/ML
10 INJECTION INTRAMUSCULAR; INTRAVENOUS
Status: DISCONTINUED | OUTPATIENT
Start: 2019-08-13 | End: 2019-08-13

## 2019-08-13 RX ORDER — ONDANSETRON 4 MG/1
4 TABLET, FILM COATED ORAL EVERY 6 HOURS PRN
Qty: 20 TABLET | Refills: 0 | Status: SHIPPED | OUTPATIENT
Start: 2019-08-13 | End: 2020-08-12

## 2019-08-13 RX ORDER — OXYCODONE HYDROCHLORIDE 10 MG/1
10 TABLET ORAL
Status: COMPLETED | OUTPATIENT
Start: 2019-08-13 | End: 2019-08-13

## 2019-08-13 RX ORDER — LIDOCAINE 50 MG/G
1 PATCH TOPICAL DAILY
Qty: 15 PATCH | Refills: 0 | Status: SHIPPED | OUTPATIENT
Start: 2019-08-13 | End: 2019-08-13 | Stop reason: SDUPTHER

## 2019-08-13 RX ORDER — LIDOCAINE 50 MG/G
1 PATCH TOPICAL DAILY
Qty: 15 PATCH | Refills: 0 | Status: SHIPPED | OUTPATIENT
Start: 2019-08-13 | End: 2020-09-01 | Stop reason: CLARIF

## 2019-08-13 RX ORDER — HYDROMORPHONE HYDROCHLORIDE 2 MG/ML
0.5 INJECTION, SOLUTION INTRAMUSCULAR; INTRAVENOUS; SUBCUTANEOUS
Status: COMPLETED | OUTPATIENT
Start: 2019-08-13 | End: 2019-08-13

## 2019-08-13 RX ADMIN — HYDROMORPHONE HYDROCHLORIDE 0.5 MG: 2 INJECTION INTRAMUSCULAR; INTRAVENOUS; SUBCUTANEOUS at 09:08

## 2019-08-13 RX ADMIN — ONDANSETRON 4 MG: 2 INJECTION INTRAMUSCULAR; INTRAVENOUS at 09:08

## 2019-08-13 RX ADMIN — PROMETHAZINE HYDROCHLORIDE 12.5 MG: 25 INJECTION INTRAMUSCULAR; INTRAVENOUS at 11:08

## 2019-08-13 RX ADMIN — OXYCODONE HYDROCHLORIDE 10 MG: 10 TABLET ORAL at 11:08

## 2019-08-13 RX ADMIN — SODIUM CHLORIDE 1000 ML: 0.9 INJECTION, SOLUTION INTRAVENOUS at 09:08

## 2019-08-13 NOTE — ED NOTES
Pt reports improvement in pain after Dilaudid administration. Pt state she is unable to urinate at this time. Provider aware. No other needs are identified. Will monitor prn.

## 2019-08-13 NOTE — ED PROVIDER NOTES
Encounter Date: 2019       History     Chief Complaint   Patient presents with    Flank Pain     left      Patient is a 44 year old female who presents with left flank pain for two hours PTA. She has PMH significant for anxiety, fibromyalgia and migraine. She states four days ago she was having hematuria and dysuria for which she was seen and started on Bactrim for a kidney infection. She states she had an outpatient CT scan planned in three days but the pain suddenly started about two hours PTA to the ER. She reports difficulty urinating and decreased urine output with continued hematuria. Subjective fever that started this AM. She denied any diarrhea. She took tylenol this morning with no improvement.     The history is provided by the patient.     Review of patient's allergies indicates:   Allergen Reactions    Morphine Anaphylaxis    Tetracyclines Anaphylaxis    Vancomycin analogues Anaphylaxis    Iodine and iodide containing products Hives, Itching and Nausea And Vomiting    Toradol [ketorolac] Nausea And Vomiting and Hallucinations    Reglan [metoclopramide hcl] Rash     Past Medical History:   Diagnosis Date    Anxiety     Fibromyalgia     Migraine      Past Surgical History:   Procedure Laterality Date    APPENDECTOMY       SECTION      CHOLECYSTECTOMY      KNEE SURGERY Right     SHOULDER SURGERY Left      No family history on file.  Social History     Tobacco Use    Smoking status: Current Every Day Smoker     Types: Vaping w/o nicotine    Smokeless tobacco: Never Used   Substance Use Topics    Alcohol use: Yes     Comment: Social    Drug use: No     Review of Systems   Constitutional: Negative for activity change, appetite change, chills and fever.   HENT: Negative for congestion, rhinorrhea and sore throat.    Eyes: Negative for redness and visual disturbance.   Respiratory: Negative for cough, chest tightness and shortness of breath.    Cardiovascular: Negative for chest  "pain.   Gastrointestinal: Positive for nausea and vomiting. Negative for abdominal pain and diarrhea.   Genitourinary: Positive for decreased urine volume, difficulty urinating, dysuria, flank pain and hematuria. Negative for frequency.   Musculoskeletal: Negative for back pain, neck pain and neck stiffness.   Skin: Negative for rash.   Neurological: Negative for dizziness, syncope, numbness and headaches.       Physical Exam     Vitals:    08/13/19 0912 08/13/19 1109   BP: (!) 200/100 (!) 156/76   Pulse: (!) 138 84   Resp: 20    Temp: 98.4 °F (36.9 °C)    TempSrc: Oral    SpO2: 97% 100%   Weight: 102.5 kg (226 lb)    Height: 5' 8" (1.727 m)        Physical Exam    Nursing note and vitals reviewed.  Constitutional: She appears well-developed and well-nourished. She is cooperative.  Non-toxic appearance. She does not have a sickly appearance.   Uncomfortable. Writhing in pain.   HENT:   Head: Normocephalic and atraumatic.   Right Ear: External ear normal.   Left Ear: External ear normal.   Nose: Nose normal.   Eyes: Lids are normal.   Neck: Full passive range of motion without pain.   Cardiovascular: Regular rhythm and normal heart sounds. Tachycardia present.  Exam reveals no gallop and no friction rub.    No murmur heard.  Pulmonary/Chest: Breath sounds normal. She has no wheezes. She has no rhonchi. She has no rales.   Abdominal: Soft. Normal appearance. There is tenderness. There is CVA tenderness. There is no rigidity, no rebound and no guarding.   Left CVA tenderness.    Neurological: She is alert.   Skin: Skin is warm, dry and intact. No rash noted.         ED Course   Procedures  Labs Reviewed   CBC W/ AUTO DIFFERENTIAL - Abnormal; Notable for the following components:       Result Value    Platelets 428 (*)     All other components within normal limits   COMPREHENSIVE METABOLIC PANEL - Abnormal; Notable for the following components:    CO2 17 (*)     Glucose 141 (*)     Calcium 11.1 (*)     Total Protein 8.6 " (*)     AST 43 (*)     ALT 47 (*)     All other components within normal limits   URINALYSIS, REFLEX TO URINE CULTURE - Abnormal; Notable for the following components:    Occult Blood UA 3+ (*)     All other components within normal limits    Narrative:     Preferred Collection Type->Urine, Clean Catch   URINALYSIS MICROSCOPIC - Abnormal; Notable for the following components:    Bacteria Moderate (*)     Other (U/A) Occasional (*)     All other components within normal limits    Narrative:     Preferred Collection Type->Urine, Clean Catch   LIPASE          Imaging Results          CT Renal Stone Study ABD Pelvis WO (Final result)  Result time 08/13/19 10:06:18    Final result by Tonia Jenkins MD (08/13/19 10:06:18)                 Impression:      No opaque renal or ureteral stone, ureteral obstruction or acute findings.  Findings as detailed above including old granulomatous disease, right ovarian cyst, prior cholecystectomy, nonvisualization of the appendix with history of prior appendectomy, mild diverticulosis versus incomplete distention of colon      Electronically signed by: Tonia Jenkins MD  Date:    08/13/2019  Time:    10:06             Narrative:    EXAMINATION:  CT RENAL STONE STUDY ABD PELVIS WO    CLINICAL HISTORY:  Flank pain, stone disease suspected;Hematuria;    TECHNIQUE:  Low dose axial images, sagittal and coronal reformations were obtained from the lung bases to the pubic symphysis.  Contrast was not administered.    COMPARISON:  5/19/2017    FINDINGS:  There is calcified granuloma left lung base    liver, gallbladder, bile ducts; liver unremarkable appearance on the noncontrast study.  Cholecystectomy clips.  No biliary duct dilatation    Spleen not enlarged    Adrenal glands unremarkable appearance    Pancreas unremarkable appearance    Abdominal aorta no aneurysm    Stomach, bowel, mesentery; appendix not seen but no abnormal appendix inflammatory changes appendiceal region is seen and  history states appendectomy.  Mild diverticulosis versus incomplete distention of colon without CT findings of acute diverticulitis.  No free intraperitoneal air or fluid    Reproductive organs; 1.5 cm right adnexal hypodensity suggesting ovarian cyst.  Otherwise essentially unremarkable for the patient's age    Kidneys, ureters, bladder; no opaque renal or ureteral stone or ureteral obstruction.  Unremarkable appearance of the kidneys on the noncontrast study.  Urinary bladder decompressed at the time of the exam and as visualized unremarkable appearance    Osseous structures; degenerative changes.                                 Medical Decision Making:   History:   Old Medical Records: I decided to obtain old medical records.  Clinical Tests:   Lab Tests: Ordered and Reviewed  Radiological Study: Ordered and Reviewed       APC / Resident Notes:   Urgent evaluation of a 44 year old female who presents with left flank pain and reported hematuria. She has left CVA tenderness. Abdomen is soft and non-tender. Labs show no leukocytosis. UA is unremarkable. CT is negative. Discussed results with patient. Return precautions given. Based on my clinical evaluation, I do not appreciate any immediate, emergent, or life threatening condition or etiology that warrants additional workup today and feel that the patient can be discharged with close follow up care.  Patient is to follow up with their primary care provider. Case was discussed with Dr. Garcia who has evaluated the patient and is in agreement with the plan of care. All questions answered.                    Clinical Impression:       ICD-10-CM ICD-9-CM   1. Left flank pain R10.9 789.09                                Lissette Gonzalez PA-C  08/13/19 5293

## 2019-08-13 NOTE — ED NOTES
Cardiovascular: Negative for chest pain.   Gastrointestinal: Positive for nausea and vomiting. Negative for abdominal pain and diarrhea.   Genitourinary: Positive for decreased urine volume, difficulty urinating, dysuria, flank pain and hematuria. Negative for frequency.

## 2019-11-17 ENCOUNTER — HOSPITAL ENCOUNTER (EMERGENCY)
Facility: HOSPITAL | Age: 44
Discharge: HOME | End: 2019-11-17
Attending: INTERNAL MEDICINE
Payer: COMMERCIAL

## 2019-11-17 VITALS
BODY MASS INDEX: 34.25 KG/M2 | HEART RATE: 104 BPM | TEMPERATURE: 98 F | DIASTOLIC BLOOD PRESSURE: 107 MMHG | HEIGHT: 68 IN | RESPIRATION RATE: 18 BRPM | OXYGEN SATURATION: 98 % | SYSTOLIC BLOOD PRESSURE: 159 MMHG | WEIGHT: 226 LBS

## 2019-11-17 DIAGNOSIS — J10.1 INFLUENZA A: Primary | ICD-10-CM

## 2019-11-17 DIAGNOSIS — J45.40 MODERATE PERSISTENT ASTHMATIC BRONCHITIS WITHOUT COMPLICATION: ICD-10-CM

## 2019-11-17 PROCEDURE — 96372 THER/PROPH/DIAG INJ SC/IM: CPT

## 2019-11-17 PROCEDURE — 63600175 PHARM REV CODE 636 W HCPCS: Performed by: INTERNAL MEDICINE

## 2019-11-17 PROCEDURE — 99284 EMERGENCY DEPT VISIT MOD MDM: CPT | Mod: 25

## 2019-11-17 RX ORDER — METHYLPREDNISOLONE 4 MG/1
TABLET ORAL
Qty: 1 PACKAGE | Refills: 0 | Status: SHIPPED | OUTPATIENT
Start: 2019-11-17 | End: 2019-12-08

## 2019-11-17 RX ORDER — AZITHROMYCIN 1 G/1
1 POWDER, FOR SUSPENSION ORAL ONCE
Qty: 1 PACKET | Refills: 0 | Status: SHIPPED | OUTPATIENT
Start: 2019-11-17 | End: 2019-11-17

## 2019-11-17 RX ORDER — IPRATROPIUM BROMIDE 42 UG/1
2 SPRAY, METERED NASAL 4 TIMES DAILY
Qty: 30 ML | Refills: 0 | Status: SHIPPED | OUTPATIENT
Start: 2019-11-17 | End: 2020-08-12

## 2019-11-17 RX ORDER — DEXAMETHASONE SODIUM PHOSPHATE 100 MG/10ML
10 INJECTION INTRAMUSCULAR; INTRAVENOUS
Status: COMPLETED | OUTPATIENT
Start: 2019-11-17 | End: 2019-11-17

## 2019-11-17 RX ORDER — OSELTAMIVIR PHOSPHATE 75 MG/1
75 CAPSULE ORAL 2 TIMES DAILY
Qty: 10 CAPSULE | Refills: 0 | Status: SHIPPED | OUTPATIENT
Start: 2019-11-17 | End: 2019-11-22

## 2019-11-17 RX ADMIN — DEXAMETHASONE SODIUM PHOSPHATE 10 MG: 10 INJECTION INTRAMUSCULAR; INTRAVENOUS at 09:11

## 2019-11-17 NOTE — ED PROVIDER NOTES
Encounter Date: 2019       History     Chief Complaint   Patient presents with    Cough    Chills    Headache    Nasal Congestion    Fever     Patient presents with cough, headache, chills, nasal congestion, sore throat, and body aches.  This is in the midst of a known flu epidemic.  No vomiting or diarrhea.  Patient has not had a flu shot.        Review of patient's allergies indicates:   Allergen Reactions    Morphine Anaphylaxis    Tetracyclines Anaphylaxis    Vancomycin analogues Anaphylaxis    Iodine and iodide containing products Hives, Itching and Nausea And Vomiting    Toradol [ketorolac] Nausea And Vomiting and Hallucinations    Reglan [metoclopramide hcl] Rash     Past Medical History:   Diagnosis Date    Anxiety     Fibromyalgia     Migraine      Past Surgical History:   Procedure Laterality Date    APPENDECTOMY       SECTION      CHOLECYSTECTOMY      KNEE SURGERY Right     SHOULDER SURGERY Left      No family history on file.  Social History     Tobacco Use    Smoking status: Current Every Day Smoker     Types: Vaping w/o nicotine    Smokeless tobacco: Never Used   Substance Use Topics    Alcohol use: Yes     Comment: Social    Drug use: No     Review of Systems   Constitutional: Negative for fever.   HENT: Positive for postnasal drip, rhinorrhea, sinus pain and sore throat.    Respiratory: Positive for cough and wheezing. Negative for shortness of breath.    Cardiovascular: Negative for chest pain.   Gastrointestinal: Negative for nausea.   Genitourinary: Negative for dysuria.   Musculoskeletal: Negative for back pain.   Skin: Negative for rash.   Neurological: Negative for weakness.   Hematological: Does not bruise/bleed easily.   All other systems reviewed and are negative.      Physical Exam     Initial Vitals [19 0859]   BP Pulse Resp Temp SpO2   (!) 159/107 104 18 98.3 °F (36.8 °C) 98 %      MAP       --         Physical Exam    Nursing note and vitals  reviewed.  Constitutional: Vital signs are normal. She appears well-developed and well-nourished. She is active and cooperative.   HENT:   Head: Normocephalic and atraumatic.   Patient with rhinorrhea, mild pharyngitis,   Eyes: Conjunctivae and lids are normal. Lids are everted and swept, no foreign bodies found.   Neck: Trachea normal, normal range of motion and full passive range of motion without pain. Neck supple.   Cardiovascular: Normal rate, regular rhythm, S1 normal, S2 normal, normal heart sounds, intact distal pulses and normal pulses.  No extrasystoles are present.    Pulmonary/Chest: She has wheezes. She has rhonchi.   Abdominal: Soft. Normal appearance and bowel sounds are normal.   Musculoskeletal: Normal range of motion.   Neurological: She is alert. She has normal reflexes. GCS eye subscore is 4. GCS verbal subscore is 5. GCS motor subscore is 6.   Skin: Skin is warm, dry and intact. Capillary refill takes less than 2 seconds.   Psychiatric: She has a normal mood and affect. Her speech is normal and behavior is normal. Cognition and memory are normal.         ED Course   Procedures  Labs Reviewed - No data to display       Imaging Results    None          Medical Decision Making:   ED Management:  Patient presents with signs and symptoms compatible with influenza.  She was treated for such as well as asthmatic bronchitis.  She was wheezing significantly.  She stop smoking approximately 3 months ago.  She has 20 QA history of smoking.  She was given Decadron intramuscular.  She also had a Z-Ian and Medrol Dosepak as well as Atrovent nasal spray                                 Clinical Impression:       ICD-10-CM ICD-9-CM   1. Influenza A J10.1 487.1   2. Moderate persistent asthmatic bronchitis without complication J45.40 493.90         Disposition:   Disposition: Discharged  Condition: Stable                     Angelo Carmona MD  11/17/19 2829

## 2019-11-18 ENCOUNTER — HOSPITAL ENCOUNTER (EMERGENCY)
Facility: HOSPITAL | Age: 44
Discharge: HOME | End: 2019-11-18
Attending: EMERGENCY MEDICINE
Payer: COMMERCIAL

## 2019-11-18 VITALS
SYSTOLIC BLOOD PRESSURE: 115 MMHG | DIASTOLIC BLOOD PRESSURE: 59 MMHG | RESPIRATION RATE: 17 BRPM | OXYGEN SATURATION: 97 % | WEIGHT: 226 LBS | HEART RATE: 94 BPM | TEMPERATURE: 98 F | BODY MASS INDEX: 34.25 KG/M2 | HEIGHT: 68 IN

## 2019-11-18 DIAGNOSIS — J98.01 BRONCHOSPASM, ACUTE: Primary | ICD-10-CM

## 2019-11-18 DIAGNOSIS — R06.00 DYSPNEA: ICD-10-CM

## 2019-11-18 LAB
INFLUENZA A, MOLECULAR: NEGATIVE
INFLUENZA B, MOLECULAR: NEGATIVE
SPECIMEN SOURCE: NORMAL

## 2019-11-18 PROCEDURE — 71046 X-RAY EXAM CHEST 2 VIEWS: CPT | Mod: 26,,, | Performed by: RADIOLOGY

## 2019-11-18 PROCEDURE — 63600175 PHARM REV CODE 636 W HCPCS: Performed by: EMERGENCY MEDICINE

## 2019-11-18 PROCEDURE — 99284 EMERGENCY DEPT VISIT MOD MDM: CPT | Mod: 25

## 2019-11-18 PROCEDURE — 71046 XR CHEST PA AND LATERAL: ICD-10-PCS | Mod: 26,,, | Performed by: RADIOLOGY

## 2019-11-18 PROCEDURE — 94640 AIRWAY INHALATION TREATMENT: CPT

## 2019-11-18 PROCEDURE — 96372 THER/PROPH/DIAG INJ SC/IM: CPT

## 2019-11-18 PROCEDURE — 87502 INFLUENZA DNA AMP PROBE: CPT

## 2019-11-18 PROCEDURE — 25000003 PHARM REV CODE 250: Performed by: EMERGENCY MEDICINE

## 2019-11-18 PROCEDURE — 25000242 PHARM REV CODE 250 ALT 637 W/ HCPCS: Performed by: EMERGENCY MEDICINE

## 2019-11-18 PROCEDURE — 94761 N-INVAS EAR/PLS OXIMETRY MLT: CPT

## 2019-11-18 PROCEDURE — 71046 X-RAY EXAM CHEST 2 VIEWS: CPT | Mod: TC,FY

## 2019-11-18 RX ORDER — IPRATROPIUM BROMIDE AND ALBUTEROL SULFATE 2.5; .5 MG/3ML; MG/3ML
3 SOLUTION RESPIRATORY (INHALATION)
Status: COMPLETED | OUTPATIENT
Start: 2019-11-18 | End: 2019-11-18

## 2019-11-18 RX ORDER — ACETAMINOPHEN 500 MG
1000 TABLET ORAL
Status: COMPLETED | OUTPATIENT
Start: 2019-11-18 | End: 2019-11-18

## 2019-11-18 RX ORDER — DEXAMETHASONE SODIUM PHOSPHATE 100 MG/10ML
10 INJECTION INTRAMUSCULAR; INTRAVENOUS
Status: COMPLETED | OUTPATIENT
Start: 2019-11-18 | End: 2019-11-18

## 2019-11-18 RX ADMIN — DEXAMETHASONE SODIUM PHOSPHATE 10 MG: 10 INJECTION INTRAMUSCULAR; INTRAVENOUS at 03:11

## 2019-11-18 RX ADMIN — IPRATROPIUM BROMIDE AND ALBUTEROL SULFATE 3 ML: .5; 3 SOLUTION RESPIRATORY (INHALATION) at 04:11

## 2019-11-18 RX ADMIN — ACETAMINOPHEN 1000 MG: 500 TABLET, FILM COATED ORAL at 03:11

## 2019-11-18 RX ADMIN — IPRATROPIUM BROMIDE AND ALBUTEROL SULFATE 3 ML: .5; 3 SOLUTION RESPIRATORY (INHALATION) at 03:11

## 2019-11-18 NOTE — DISCHARGE INSTRUCTIONS
You must fill the Medrol Dosepak previously written and begin it as well as albuterol 2 puffs every 4-6 hours  Continue other medications as previous and written  Tylenol Motrin for pain or fever    Follow-up primary care provider

## 2019-11-18 NOTE — ED PROVIDER NOTES
Encounter Date: 2019       History     Chief Complaint   Patient presents with    Shortness of Breath     Kristin Min is a 44 y.o.female who complains of acute flu like illness with non productive cough and wheeze min improved with treatment rendered at prior visit where she was clinically diagnosed with flu and placed on symptomatic meds including Atrovent nasal spray / decadron / tamiflu - however no b agonist appears to have been prescribed.   Mechanism:spontaneous onset in past 3 days   Location: no acute pain   Character: cough and wheeze   Timing:gradual   Duration:approx 3 days   Severity:mod to severe   Radiation:none   Associated:fever, diffuse arthralgia and myalgia   Prior:No prior CAP or admission for pulm issues.           Review of patient's allergies indicates:   Allergen Reactions    Morphine Anaphylaxis    Tetracyclines Anaphylaxis    Vancomycin analogues Anaphylaxis    Iodine and iodide containing products Hives, Itching and Nausea And Vomiting    Toradol [ketorolac] Nausea And Vomiting and Hallucinations    Reglan [metoclopramide hcl] Rash     Past Medical History:   Diagnosis Date    Anxiety     Fibromyalgia     Migraine      Past Surgical History:   Procedure Laterality Date    APPENDECTOMY       SECTION      CHOLECYSTECTOMY      KNEE SURGERY Right     SHOULDER SURGERY Left      History reviewed. No pertinent family history.  Social History     Tobacco Use    Smoking status: Current Every Day Smoker     Types: Vaping w/o nicotine    Smokeless tobacco: Never Used   Substance Use Topics    Alcohol use: Yes     Comment: Social    Drug use: No     Review of Systems   Constitutional: Positive for fatigue and fever.   HENT: Positive for congestion, postnasal drip, rhinorrhea, sinus pressure and sinus pain. Negative for ear discharge, ear pain, facial swelling, sneezing, sore throat, trouble swallowing and voice change.    Respiratory: Positive for cough,  shortness of breath and wheezing.    Cardiovascular: Negative for chest pain, palpitations and leg swelling.   Gastrointestinal: Negative.    Musculoskeletal: Positive for arthralgias and myalgias.   Skin: Negative.    Neurological: Positive for headaches. Negative for weakness and light-headedness.   Hematological: Negative.    All other systems reviewed and are negative.      Physical Exam     Initial Vitals [11/18/19 0324]   BP Pulse Resp Temp SpO2   134/85 100 20 98.1 °F (36.7 °C) 97 %      MAP       --         Physical Exam    Nursing note and vitals reviewed.  Constitutional: She appears well-developed and well-nourished. She is not diaphoretic. No distress.   HENT:   Head: Normocephalic and atraumatic.   Nose: Nose normal.   Mouth/Throat: Oropharynx is clear and moist. No oropharyngeal exudate.   Eyes: Conjunctivae and EOM are normal. Pupils are equal, round, and reactive to light. Right eye exhibits no discharge. Left eye exhibits no discharge. No scleral icterus.   Neck: Normal range of motion. Neck supple.   Cardiovascular: Normal rate, regular rhythm, normal heart sounds and intact distal pulses. Exam reveals no gallop and no friction rub.    No murmur heard.  Pulmonary/Chest: No respiratory distress. She has wheezes. She has no rhonchi. She has no rales.   Abdominal: Soft. Bowel sounds are normal. She exhibits no distension and no mass. There is no tenderness. There is no rebound and no guarding.   Musculoskeletal: Normal range of motion. She exhibits no edema or tenderness.   Lymphadenopathy:     She has no cervical adenopathy.   Neurological: She is alert and oriented to person, place, and time. GCS score is 15. GCS eye subscore is 4. GCS verbal subscore is 5. GCS motor subscore is 6.   Skin: Skin is warm and dry. Capillary refill takes less than 2 seconds. No rash noted. No erythema. No pallor.   Psychiatric: She has a normal mood and affect. Her behavior is normal. Judgment and thought content  normal.         ED Course   Procedures  Labs Reviewed   INFLUENZA A & B BY MOLECULAR   Flu negative.          Imaging Results          X-Ray Chest PA And Lateral (In process)               X-Rays:   Independently Interpreted Readings:   Chest X-Ray: No acute consolidation       Imaging Results          X-Ray Chest PA And Lateral (Final result)  Result time 11/18/19 09:16:44    Final result by Joel Martins MD (11/18/19 09:16:44)                 Impression:      No acute chest disease.      Electronically signed by: Joel Martins  Date:    11/18/2019  Time:    09:16             Narrative:    EXAMINATION:  XR CHEST PA AND LATERAL    CLINICAL HISTORY:  Dyspnea, unspecified    TECHNIQUE:  PA and lateral views of the chest were performed.    COMPARISON:  03/16/2019 and 05/19/2017.    FINDINGS:  Small stable calcified granuloma at the left lung base.The lungs are otherwise clear, with normal appearance of pulmonary vasculature and no pleural effusion or pneumothorax.    The cardiac silhouette is normal in size. The hilar and mediastinal contours are unremarkable.    Bones are intact.                              Two Duoneb  Repeat Decadron 10mg IM  Stable to DC home as per AVS after some improvement with ED treatment                        You must fill the Medrol Dosepak previously written and begin it as well as albuterol 2 puffs every 4-6 hours  Continue other medications as previous and written  Tylenol Motrin for pain or fever    Follow-up primary care provider             Clinical Impression:       ICD-10-CM ICD-9-CM   1. Bronchospasm, acute J98.01 519.11   2. Dyspnea R06.00 786.09                             Herbert José MD  11/21/19 0593

## 2019-11-18 NOTE — ED TRIAGE NOTES
Pt c/o SOB. Pt seen here yesterday and dx'd with the flu. Pt with audible wheezing and requesting pain medication

## 2020-01-16 ENCOUNTER — HOSPITAL ENCOUNTER (EMERGENCY)
Facility: HOSPITAL | Age: 45
Discharge: HOME | End: 2020-01-16
Attending: EMERGENCY MEDICINE

## 2020-01-16 VITALS
TEMPERATURE: 99 F | BODY MASS INDEX: 35.47 KG/M2 | RESPIRATION RATE: 20 BRPM | SYSTOLIC BLOOD PRESSURE: 140 MMHG | OXYGEN SATURATION: 98 % | HEIGHT: 67 IN | HEART RATE: 88 BPM | WEIGHT: 226 LBS | DIASTOLIC BLOOD PRESSURE: 76 MMHG

## 2020-01-16 DIAGNOSIS — N94.6 SEVERE MENSTRUAL CRAMPS: Primary | ICD-10-CM

## 2020-01-16 DIAGNOSIS — N94.6 MENORRHALGIA: ICD-10-CM

## 2020-01-16 PROCEDURE — 99283 EMERGENCY DEPT VISIT LOW MDM: CPT

## 2020-01-16 PROCEDURE — 25000003 PHARM REV CODE 250: Performed by: EMERGENCY MEDICINE

## 2020-01-16 RX ORDER — ONDANSETRON 4 MG/1
4 TABLET, ORALLY DISINTEGRATING ORAL
Status: COMPLETED | OUTPATIENT
Start: 2020-01-16 | End: 2020-01-16

## 2020-01-16 RX ORDER — KETOROLAC TROMETHAMINE 30 MG/ML
60 INJECTION, SOLUTION INTRAMUSCULAR; INTRAVENOUS
Status: DISCONTINUED | OUTPATIENT
Start: 2020-01-16 | End: 2020-01-16 | Stop reason: HOSPADM

## 2020-01-16 RX ORDER — IBUPROFEN 400 MG/1
800 TABLET ORAL
Status: DISCONTINUED | OUTPATIENT
Start: 2020-01-16 | End: 2020-01-16 | Stop reason: HOSPADM

## 2020-01-16 RX ADMIN — ONDANSETRON 4 MG: 4 TABLET, ORALLY DISINTEGRATING ORAL at 01:01

## 2020-01-16 NOTE — ED NOTES
"Patient refused Ibuprofen 800mg. Patient stated, "I have been taking 800mg of Ibuprofen and it isn't helping. I do not want to take anymore. It is tearing my stomach up". Will inform ERP.   "

## 2020-01-16 NOTE — ED PROVIDER NOTES
Encounter Date: 2020       History   No chief complaint on file.    44-year-old female with past medical history of anxiety, fibromyalgia, migraine headaches, irregular menstrual cycle presents to the ED for evaluation of intermittent lower abdominal cramping with associated vaginal bleeding for 3 days.  States she began her cycle 3 days ago and describes it as being heavy year and more painful than normal. For history of ovarian cyst and believes that this is likely the cause of her pain. Follow with Dr. Veliz in the past and has been advised to have a hysterectomy in the past by physician seen in [place]; however, she did not pursue this at the time.  Denies fever, chills, nausea, vomiting, diarrhea, constipation.  Denies chest pain, diaphoresis, palpitations, dyspnea.     Requesting referral to GYN today.        Review of patient's allergies indicates:   Allergen Reactions    Morphine Anaphylaxis    Tetracyclines Anaphylaxis    Vancomycin analogues Anaphylaxis    Iodine and iodide containing products Hives, Itching and Nausea And Vomiting    Toradol [ketorolac] Nausea And Vomiting and Hallucinations    Reglan [metoclopramide hcl] Rash     Past Medical History:   Diagnosis Date    Anxiety     Fibromyalgia     Migraine      Past Surgical History:   Procedure Laterality Date    APPENDECTOMY       SECTION      CHOLECYSTECTOMY      KNEE SURGERY Right     SHOULDER SURGERY Left      No family history on file.  Social History     Tobacco Use    Smoking status: Current Every Day Smoker     Types: Vaping w/o nicotine    Smokeless tobacco: Never Used   Substance Use Topics    Alcohol use: Yes     Comment: Social    Drug use: No     Review of Systems   Constitutional: Negative for appetite change, chills, diaphoresis, fatigue and fever.   HENT: Negative for congestion, ear pain, rhinorrhea, sinus pressure, sinus pain, sore throat and tinnitus.    Eyes: Negative for photophobia and visual  disturbance.   Respiratory: Negative for cough, chest tightness, shortness of breath and wheezing.    Cardiovascular: Negative for chest pain, palpitations and leg swelling.   Gastrointestinal: Positive for abdominal pain. Negative for constipation, diarrhea, nausea and vomiting.   Endocrine: Negative for cold intolerance, heat intolerance, polydipsia, polyphagia and polyuria.   Genitourinary: Positive for menstrual problem and vaginal bleeding. Negative for decreased urine volume, difficulty urinating, dysuria, flank pain, frequency, hematuria and urgency.   Musculoskeletal: Negative for arthralgias, back pain, gait problem, joint swelling, myalgias, neck pain and neck stiffness.   Skin: Negative for color change, pallor, rash and wound.   Allergic/Immunologic: Negative for immunocompromised state.   Neurological: Negative for dizziness, syncope, weakness, light-headedness, numbness and headaches.   Hematological: Negative for adenopathy. Does not bruise/bleed easily.   Psychiatric/Behavioral: Negative for decreased concentration, dysphoric mood and sleep disturbance. The patient is not nervous/anxious.    All other systems reviewed and are negative.      Physical Exam     Initial Vitals [01/16/20 1314]   BP Pulse Resp Temp SpO2   -- 91 20 98.8 °F (37.1 °C) 98 %      MAP       --         Physical Exam    Nursing note and vitals reviewed.  Constitutional: She appears well-developed and well-nourished. She is not diaphoretic. No distress.   HENT:   Head: Normocephalic and atraumatic.   Right Ear: External ear normal.   Left Ear: External ear normal.   Nose: Nose normal.   Mouth/Throat: Oropharynx is clear and moist.   Eyes: Conjunctivae are normal. Pupils are equal, round, and reactive to light. No scleral icterus.   Neck: Normal range of motion. Neck supple.   Cardiovascular: Normal rate, regular rhythm, normal heart sounds and intact distal pulses.   Pulmonary/Chest: Breath sounds normal. No respiratory distress.  "She has no wheezes. She has no rhonchi. She exhibits no tenderness.   Abdominal: Soft. Bowel sounds are normal. She exhibits no distension. There is no tenderness. There is no rebound and no guarding.   Musculoskeletal: Normal range of motion. She exhibits no edema or tenderness.   Lymphadenopathy:     She has no cervical adenopathy.   Neurological: She is alert and oriented to person, place, and time. GCS score is 15. GCS eye subscore is 4. GCS verbal subscore is 5. GCS motor subscore is 6.   Skin: Skin is warm and dry. Capillary refill takes less than 2 seconds. No rash noted. No erythema. No pallor.   Psychiatric: She has a normal mood and affect. Her behavior is normal. Judgment and thought content normal.         ED Course   Procedures  Labs Reviewed - No data to display       Imaging Results    None          Medical Decision Making:   Differential Diagnosis:   Menorrhagia, menstrual cramps, endometriosis, uterine fibroids  ED Management:  Offered Motrin for pain control; however, this was declined by the patient - states she will only except Dilaudid, Tramadol, Percocet.  No current indication for narcotics, this was explained. The patient became very angry refusing to provide urine or allow for labs. Relays "I will just go to [facility], they will give me what I want."    Discussed definitive care with gynecology in the patient has been provided an appointment with Dr. Veliz.  States she cannot see him before she receives her check, which she receive next week; appointment scheduled for next Thursday.                                     Clinical Impression:       ICD-10-CM ICD-9-CM   1. Severe menstrual cramps N94.6 625.3   2. Menorrhalgia N94.6 625.3         Disposition:   Disposition: Discharged  Condition: Stable                     Stacy Castro MD  01/16/20 1401       Stacy Castro MD  01/16/20 1417    "

## 2020-08-12 ENCOUNTER — HOSPITAL ENCOUNTER (EMERGENCY)
Facility: HOSPITAL | Age: 45
Discharge: HOME | End: 2020-08-12
Payer: OTHER GOVERNMENT

## 2020-08-12 VITALS
OXYGEN SATURATION: 97 % | SYSTOLIC BLOOD PRESSURE: 135 MMHG | HEART RATE: 117 BPM | HEIGHT: 68 IN | WEIGHT: 175 LBS | BODY MASS INDEX: 26.52 KG/M2 | TEMPERATURE: 99 F | DIASTOLIC BLOOD PRESSURE: 92 MMHG | RESPIRATION RATE: 24 BRPM

## 2020-08-12 DIAGNOSIS — K08.89 PAIN, DENTAL: Primary | ICD-10-CM

## 2020-08-12 PROCEDURE — 99283 EMERGENCY DEPT VISIT LOW MDM: CPT

## 2020-08-12 RX ORDER — HYDROCODONE BITARTRATE AND ACETAMINOPHEN 5; 325 MG/1; MG/1
1 TABLET ORAL EVERY 6 HOURS PRN
Qty: 12 TABLET | Refills: 0 | Status: SHIPPED | OUTPATIENT
Start: 2020-08-12 | End: 2020-08-15

## 2020-08-12 NOTE — ED TRIAGE NOTES
"Patient c/o dental pain located to right front tooth. Patient states "I went to yell at the dog and I guess I snapped my teeth together. I have a crown (porcelain tooth) on a steel post and the post is broke. I feel something sharp poking and putting pressure next to the eye tooth next to it. It feels like something is stabbing in my cheek and going into my eye." rates pain 10/10. Patient appears anxious and very tearful. Holding face. Patient also appears tachypneic. No acute distress noted.   "

## 2020-08-12 NOTE — DISCHARGE INSTRUCTIONS
Take the medications as prescribed. Return for any worsening or new symptoms. Follow up with Dentist at scheduled appointment in 2 days.

## 2020-08-12 NOTE — ED PROVIDER NOTES
Please note that my documentation in this Electronic Healthcare Record was produced using speech recognition software and therefore may contain errors related to that software.These could include grammar, punctuation and spelling errors or the inclusion/ exclusion of phrases that were not intended. Please contact myself for any clarification, questions or concerns.    HPI: Patient is a 45 y.o. female who presents with the chief complaint of dental pain X today.  Patient has a known history of implants with porcelain 0 tooth.  States that she shut her mouth heart today and thinks she must have broken the screw.  Has an appointment with her dentist in 2 days.  Because of her pain, they advised her to come here.  Denies any other acute symptoms.  No chest pain, difficulty breathing, fever or chills, dysphagia.  Denies any current tobacco, drug, or alcohol.  Has tried some Tylenol for pain without much improvement.    REVIEW OF SYSTEMS - 10 systems were independently reviewed and are otherwise negative with the exception of those items previously documented in the HPI and nursing notes.    Allergy: Morphine, Tetracyclines, Vancomycin analogues, Iodine and iodide containing products, Toradol [ketorolac], and Reglan [metoclopramide hcl]    Past medical history:   Past Medical History:   Diagnosis Date    Anxiety     Fibromyalgia     Migraine        Surgical History:   Past Surgical History:   Procedure Laterality Date    APPENDECTOMY       SECTION      CHOLECYSTECTOMY      KNEE SURGERY Right     SHOULDER SURGERY Left        Social history:   Social History     Socioeconomic History    Marital status:      Spouse name: Not on file    Number of children: Not on file    Years of education: Not on file    Highest education level: Not on file   Occupational History    Not on file   Social Needs    Financial resource strain: Not on file    Food insecurity     Worry: Not on file     Inability: Not  "on file    Transportation needs     Medical: Not on file     Non-medical: Not on file   Tobacco Use    Smoking status: Former Smoker    Smokeless tobacco: Never Used   Substance and Sexual Activity    Alcohol use: Yes     Alcohol/week: 3.0 standard drinks     Types: 3 Cans of beer per week     Comment: Social    Drug use: No    Sexual activity: Yes   Lifestyle    Physical activity     Days per week: Not on file     Minutes per session: Not on file    Stress: Not on file   Relationships    Social connections     Talks on phone: Not on file     Gets together: Not on file     Attends Spiritism service: Not on file     Active member of club or organization: Not on file     Attends meetings of clubs or organizations: Not on file     Relationship status: Not on file   Other Topics Concern    Not on file   Social History Narrative    Not on file       Family history: non-contributory    EHR: reviewed    Vitals: BP (!) 135/92 (BP Location: Left arm, Patient Position: Sitting)   Pulse (!) 117   Temp 98.6 °F (37 °C) (Oral)   Resp (!) 24   Ht 5' 8" (1.727 m)   Wt 79.4 kg (175 lb)   LMP 08/10/2020 (Exact Date)   SpO2 97%   Breastfeeding No   BMI 26.61 kg/m²     PHYSICAL EXAM:    General-45-year-old female awake and alert, oriented, GCS 15, in no acute distress,  HEENT- normocephalic, atraumatic, sclera anicteric, moist mucous membranes, PERRL, EOMI, multiple teeth are missing or decayed to the gumline.  The left upper front tooth is in place without any obvious signs of infection.  CARDIOVASCULAR- regular rate and rhythm  PULMONARY- nonlabored, no respiratory distress  NEUROLOGIC- mental status normal, speech fluid, cognition normal, CN II-XII grossly intact, sensations equal normal bilateral upper and lower extremities, peripheral pulse 2 +/4, ambulatory with proper gait.  MUSCULOSKELETAL- well-nourished, well-developed  DERMATOLOGIC- warm and dry, no visible rashes  PSYCHIATRIC- normal affect, normal " concentration           Labs Reviewed - No data to display    No orders to display       MEDICAL DECISION MAKING: Patient is a 45 y.o. female who presented with chief complaint of dental pain.  No inciting trauma or injury but did shut her mouth heart which did cause the pain.  Believes she might have broken screw in the implant.  On exam, the tube is intact and there is no obvious signs of rupture through the gumline.  I did have a shared decision making conversation with the patient regards to CT imaging in transfer for oral surgery.  She would like to just wait until she sees her dentist in 2 days electively for pain.  I did check [place]  is no obvious signs of narcotic misuse.  She is given 3 days of [place] until she can see her dentist.  I does reveal a heart rate of 117 and tachypnea but she was hysterical on vitals and during examination.    CLINICAL IMPRESSION:  1. Pain, dental         NICOLE Salguero  08/12/20 9542

## 2020-08-12 NOTE — ED NOTES
Discharge instructions reviewed and provided to patient. Educated patient to follow up with her dentist ( Dr. Kent) as scheduled on Friday and take medications prescribed as needed for pain. Instructed patient to use moist heat to face as instructed by provider. Use precautions to prevent burns. Encouraged patient to return to ER as needed for new or worsening symptoms. Patient verbalized understanding via teach back method. No questions or concerns at this time. Patient ambulatory with steady gait unassisted to registration to complete discharge.

## 2020-09-01 ENCOUNTER — HOSPITAL ENCOUNTER (EMERGENCY)
Facility: HOSPITAL | Age: 45
Discharge: HOME | End: 2020-09-01
Attending: FAMILY MEDICINE
Payer: OTHER GOVERNMENT

## 2020-09-01 VITALS
DIASTOLIC BLOOD PRESSURE: 87 MMHG | TEMPERATURE: 99 F | WEIGHT: 175 LBS | HEIGHT: 68 IN | OXYGEN SATURATION: 99 % | BODY MASS INDEX: 26.52 KG/M2 | SYSTOLIC BLOOD PRESSURE: 134 MMHG | RESPIRATION RATE: 16 BRPM | HEART RATE: 90 BPM

## 2020-09-01 DIAGNOSIS — I10 HYPERTENSION, UNSPECIFIED TYPE: Primary | ICD-10-CM

## 2020-09-01 DIAGNOSIS — R07.9 CHEST PAIN: ICD-10-CM

## 2020-09-01 LAB
ALBUMIN SERPL BCP-MCNC: 4.3 G/DL (ref 3.5–5.2)
ALP SERPL-CCNC: 73 U/L (ref 55–135)
ALT SERPL W/O P-5'-P-CCNC: 33 U/L (ref 10–44)
ANION GAP SERPL CALC-SCNC: 13 MMOL/L (ref 8–16)
AST SERPL-CCNC: 25 U/L (ref 10–40)
BASOPHILS # BLD AUTO: 0.01 K/UL (ref 0–0.2)
BASOPHILS NFR BLD: 0.1 % (ref 0–1.9)
BILIRUB SERPL-MCNC: 1 MG/DL (ref 0.1–1)
BUN SERPL-MCNC: 6 MG/DL (ref 6–20)
CALCIUM SERPL-MCNC: 9.2 MG/DL (ref 8.7–10.5)
CHLORIDE SERPL-SCNC: 108 MMOL/L (ref 95–110)
CO2 SERPL-SCNC: 17 MMOL/L (ref 23–29)
CREAT SERPL-MCNC: 0.5 MG/DL (ref 0.5–1.4)
DIFFERENTIAL METHOD: ABNORMAL
EOSINOPHIL # BLD AUTO: 0.2 K/UL (ref 0–0.5)
EOSINOPHIL NFR BLD: 1.7 % (ref 0–8)
ERYTHROCYTE [DISTWIDTH] IN BLOOD BY AUTOMATED COUNT: 14.6 % (ref 11.5–14.5)
EST. GFR  (AFRICAN AMERICAN): >60 ML/MIN/1.73 M^2
EST. GFR  (NON AFRICAN AMERICAN): >60 ML/MIN/1.73 M^2
GLUCOSE SERPL-MCNC: 78 MG/DL (ref 70–110)
HCT VFR BLD AUTO: 39.2 % (ref 37–48.5)
HGB BLD-MCNC: 12.8 G/DL (ref 12–16)
IMM GRANULOCYTES # BLD AUTO: 0.03 K/UL (ref 0–0.04)
IMM GRANULOCYTES NFR BLD AUTO: 0.3 % (ref 0–0.5)
LYMPHOCYTES # BLD AUTO: 2.2 K/UL (ref 1–4.8)
LYMPHOCYTES NFR BLD: 21.7 % (ref 18–48)
MCH RBC QN AUTO: 29.3 PG (ref 27–31)
MCHC RBC AUTO-ENTMCNC: 32.7 G/DL (ref 32–36)
MCV RBC AUTO: 90 FL (ref 82–98)
MONOCYTES # BLD AUTO: 0.7 K/UL (ref 0.3–1)
MONOCYTES NFR BLD: 7.4 % (ref 4–15)
NEUTROPHILS # BLD AUTO: 6.9 K/UL (ref 1.8–7.7)
NEUTROPHILS NFR BLD: 68.8 % (ref 38–73)
NRBC BLD-RTO: 0 /100 WBC
PLATELET # BLD AUTO: 328 K/UL (ref 150–350)
PMV BLD AUTO: 10 FL (ref 9.2–12.9)
POTASSIUM SERPL-SCNC: 3.6 MMOL/L (ref 3.5–5.1)
PROT SERPL-MCNC: 8 G/DL (ref 6–8.4)
RBC # BLD AUTO: 4.37 M/UL (ref 4–5.4)
SODIUM SERPL-SCNC: 138 MMOL/L (ref 136–145)
TROPONIN I SERPL DL<=0.01 NG/ML-MCNC: <0.01 NG/ML (ref 0.02–0.5)
WBC # BLD AUTO: 10.04 K/UL (ref 3.9–12.7)

## 2020-09-01 PROCEDURE — 71045 X-RAY EXAM CHEST 1 VIEW: CPT | Mod: 26,,, | Performed by: RADIOLOGY

## 2020-09-01 PROCEDURE — 71045 XR CHEST AP PORTABLE: ICD-10-PCS | Mod: 26,,, | Performed by: RADIOLOGY

## 2020-09-01 PROCEDURE — 84484 ASSAY OF TROPONIN QUANT: CPT

## 2020-09-01 PROCEDURE — 93005 ELECTROCARDIOGRAM TRACING: CPT

## 2020-09-01 PROCEDURE — 80053 COMPREHEN METABOLIC PANEL: CPT

## 2020-09-01 PROCEDURE — 25000003 PHARM REV CODE 250: Performed by: FAMILY MEDICINE

## 2020-09-01 PROCEDURE — 99285 EMERGENCY DEPT VISIT HI MDM: CPT | Mod: 25

## 2020-09-01 PROCEDURE — 85025 COMPLETE CBC W/AUTO DIFF WBC: CPT

## 2020-09-01 PROCEDURE — 71045 X-RAY EXAM CHEST 1 VIEW: CPT | Mod: TC,FY

## 2020-09-01 RX ORDER — TRAMADOL HYDROCHLORIDE 50 MG/1
100 TABLET ORAL
Status: COMPLETED | OUTPATIENT
Start: 2020-09-01 | End: 2020-09-01

## 2020-09-01 RX ORDER — ACETAMINOPHEN 500 MG
1000 TABLET ORAL
Status: COMPLETED | OUTPATIENT
Start: 2020-09-01 | End: 2020-09-01

## 2020-09-01 RX ORDER — CLONIDINE HYDROCHLORIDE 0.1 MG/1
0.2 TABLET ORAL
Status: DISCONTINUED | OUTPATIENT
Start: 2020-09-01 | End: 2020-09-01

## 2020-09-01 RX ADMIN — TRAMADOL HYDROCHLORIDE 100 MG: 50 TABLET, FILM COATED ORAL at 03:09

## 2020-09-01 RX ADMIN — ACETAMINOPHEN 1000 MG: 500 TABLET, FILM COATED ORAL at 03:09

## 2020-09-01 NOTE — ED TRIAGE NOTES
Chest pain started yesterday radiates into left arm, she also alleges that she passed out twice, once this morning and once yesterday.

## 2020-09-01 NOTE — ED PROVIDER NOTES
Encounter Date: 2020       History     Chief Complaint   Patient presents with    Chest Pain     radiates into arm    Loss of Consciousness     yesterday and today     45-year-old female presents the ED complaining of feeling as if her blood pressure is elevated at other times it goes low and now most faint she states she has sensation of heaviness in the chest neck tightness tingling in the hand she states that she is under increased stress but denies lavinia anxiety or depression my  has that after his amputated leg she states she is the sole caregiver in her family she denies any suicidal ideation and is not having insomnia        Review of patient's allergies indicates:   Allergen Reactions    Morphine Anaphylaxis    Tetracyclines Anaphylaxis    Vancomycin analogues Anaphylaxis    Iodine and iodide containing products Hives, Itching and Nausea And Vomiting    Toradol [ketorolac] Nausea And Vomiting and Hallucinations    Reglan [metoclopramide hcl] Rash     Past Medical History:   Diagnosis Date    Anxiety     Fibromyalgia     Migraine      Past Surgical History:   Procedure Laterality Date    APPENDECTOMY       SECTION      CHOLECYSTECTOMY      KNEE SURGERY Right     SHOULDER SURGERY Left      History reviewed. No pertinent family history.  Social History     Tobacco Use    Smoking status: Former Smoker    Smokeless tobacco: Never Used   Substance Use Topics    Alcohol use: Yes     Alcohol/week: 3.0 standard drinks     Types: 3 Cans of beer per week     Comment: Social    Drug use: No     Review of Systems   Constitutional: Negative for fever.   HENT: Negative for sore throat.    Respiratory: Negative for shortness of breath.    Cardiovascular: Negative for chest pain.   Gastrointestinal: Negative for nausea.   Genitourinary: Negative for dysuria.   Musculoskeletal: Negative for back pain.   Skin: Negative for rash.   Neurological: Negative for weakness.   Hematological:  Does not bruise/bleed easily.       Physical Exam     Initial Vitals [09/01/20 1305]   BP Pulse Resp Temp SpO2   (!) 141/95 98 18 98.5 °F (36.9 °C) 97 %      MAP       --         Physical Exam    Nursing note and vitals reviewed.  Constitutional: She appears well-developed and well-nourished. She is not diaphoretic. No distress.   Patient is very talkative   HENT:   Head: Normocephalic and atraumatic.   Right Ear: External ear normal.   Left Ear: External ear normal.   Eyes: Pupils are equal, round, and reactive to light. Right eye exhibits no discharge. Left eye exhibits no discharge.   Neck: No tracheal deviation present. No JVD present.   Cardiovascular: Exam reveals no friction rub.    No murmur heard.  Pulmonary/Chest: No stridor. No respiratory distress. She has no wheezes. She has no rales.   Abdominal: Bowel sounds are normal. She exhibits no distension.   Musculoskeletal: Normal range of motion.   Neurological: She is alert.   Skin: Skin is warm.   Psychiatric: She has a normal mood and affect.         ED Course   Procedures  Labs Reviewed   CBC W/ AUTO DIFFERENTIAL - Abnormal; Notable for the following components:       Result Value    RDW 14.6 (*)     All other components within normal limits   COMPREHENSIVE METABOLIC PANEL - Abnormal; Notable for the following components:    CO2 17 (*)     All other components within normal limits   TROPONIN I - Abnormal; Notable for the following components:    Troponin I <0.01 (*)     All other components within normal limits     EKG Readings: (Independently Interpreted)   Initial Reading: No STEMI. Rhythm: Normal Sinus Rhythm. Heart Rate: 84. Ectopy: No Ectopy. Conduction: Normal. ST Segments: Normal ST Segments. T Waves: Normal.       Imaging Results          X-Ray Chest AP Portable (Final result)  Result time 09/01/20 13:33:08    Final result by Joel Martins MD (09/01/20 13:33:08)                 Impression:      No acute abnormality.      Electronically  signed by: Joel Martins  Date:    09/01/2020  Time:    13:33             Narrative:    EXAMINATION:  XR CHEST AP PORTABLE    CLINICAL HISTORY:  pain;.    TECHNIQUE:  Single frontal portable view of the chest was performed.    COMPARISON:  11/18/2019.    FINDINGS:  Support devices: None    The lungs are clear, with normal appearance of pulmonary vasculature and no pleural effusion or pneumothorax.    The cardiac silhouette is normal in size. The hilar and mediastinal contours are unremarkable.    Bones are intact.                                                                 Clinical Impression:       ICD-10-CM ICD-9-CM   1. Hypertension, unspecified type  I10 401.9   2. Chest pain  R07.9 786.50             ED Disposition Condition    Discharge Stable        ED Prescriptions     None        Follow-up Information    None                                    Aurelio Pacheco MD  09/01/20 5588

## 2020-10-07 ENCOUNTER — HOSPITAL ENCOUNTER (EMERGENCY)
Facility: HOSPITAL | Age: 45
Discharge: HOME | End: 2020-10-07
Attending: INTERNAL MEDICINE
Payer: OTHER GOVERNMENT

## 2020-10-07 VITALS
TEMPERATURE: 98 F | HEART RATE: 85 BPM | HEIGHT: 68 IN | DIASTOLIC BLOOD PRESSURE: 76 MMHG | SYSTOLIC BLOOD PRESSURE: 141 MMHG | WEIGHT: 156 LBS | RESPIRATION RATE: 18 BRPM | OXYGEN SATURATION: 97 % | BODY MASS INDEX: 23.64 KG/M2

## 2020-10-07 DIAGNOSIS — R05.9 COUGH: ICD-10-CM

## 2020-10-07 DIAGNOSIS — U07.1 COVID-19 DETERMINED BY CLINICAL DIAGNOSTIC CRITERIA: Primary | ICD-10-CM

## 2020-10-07 LAB — SARS-COV-2 RDRP RESP QL NAA+PROBE: NEGATIVE

## 2020-10-07 PROCEDURE — 71045 XR CHEST AP PORTABLE: ICD-10-PCS | Mod: 26,,, | Performed by: RADIOLOGY

## 2020-10-07 PROCEDURE — U0002 COVID-19 LAB TEST NON-CDC: HCPCS

## 2020-10-07 PROCEDURE — 99283 EMERGENCY DEPT VISIT LOW MDM: CPT | Mod: 25

## 2020-10-07 PROCEDURE — 71045 X-RAY EXAM CHEST 1 VIEW: CPT | Mod: TC,FY

## 2020-10-07 PROCEDURE — 71045 X-RAY EXAM CHEST 1 VIEW: CPT | Mod: 26,,, | Performed by: RADIOLOGY

## 2020-10-07 RX ORDER — DEXAMETHASONE 6 MG/1
6 TABLET ORAL
Qty: 7 TABLET | Refills: 0 | Status: SHIPPED | OUTPATIENT
Start: 2020-10-07 | End: 2020-10-14

## 2020-10-07 NOTE — ED PROVIDER NOTES
Encounter Date: 10/7/2020       History     Chief Complaint   Patient presents with    COVID-19 Concerns    Fever    Generalized Body Aches     Patient presents to the ER with complaint of cough shortness of breath body aches fever.  Patient states 2 family members living in the same home have been tested and were both positive COVID-19.  Patient states this started yesterday denies ever having similar symptoms in the past.  Patient denies any nausea vomiting diarrhea denies any chest pain denies loss of taste or smell.  Patient states not taking any cough medicine the symptoms.  Patient denied other associated symptoms.    The history is provided by the patient.     Review of patient's allergies indicates:   Allergen Reactions    Morphine Anaphylaxis    Tetracyclines Anaphylaxis    Vancomycin analogues Anaphylaxis    Iodine and iodide containing products Hives, Itching and Nausea And Vomiting    Toradol [ketorolac] Nausea And Vomiting and Hallucinations    Reglan [metoclopramide hcl] Rash     Past Medical History:   Diagnosis Date    Anxiety     Fibromyalgia     Migraine      Past Surgical History:   Procedure Laterality Date    APPENDECTOMY       SECTION      CHOLECYSTECTOMY      KNEE SURGERY Right     SHOULDER SURGERY Left      History reviewed. No pertinent family history.  Social History     Tobacco Use    Smoking status: Former Smoker    Smokeless tobacco: Never Used   Substance Use Topics    Alcohol use: Yes     Alcohol/week: 3.0 standard drinks     Types: 3 Cans of beer per week     Comment: Social    Drug use: No     Review of Systems   Constitutional: Positive for chills, fatigue and fever.   HENT: Positive for congestion. Negative for sore throat.    Eyes: Negative for pain, discharge and itching.   Respiratory: Positive for cough and shortness of breath. Negative for wheezing and stridor.    Cardiovascular: Negative for chest pain, palpitations and leg swelling.    Gastrointestinal: Negative for abdominal pain, constipation, diarrhea, nausea and vomiting.   Genitourinary: Negative for difficulty urinating.   Musculoskeletal: Positive for myalgias. Negative for back pain, neck pain and neck stiffness.   Skin: Negative for rash.   Neurological: Negative for dizziness, weakness and headaches.   Hematological: Does not bruise/bleed easily.   All other systems reviewed and are negative.      Physical Exam     Initial Vitals [10/07/20 1110]   BP Pulse Resp Temp SpO2   (!) 152/104 85 18 98.2 °F (36.8 °C) 97 %      MAP       --         Physical Exam    Nursing note and vitals reviewed.  Constitutional: She appears well-developed and well-nourished. She is not diaphoretic. No distress.   HENT:   Head: Atraumatic.   Right Ear: External ear normal.   Left Ear: External ear normal.   Nose: Nose normal.   Mouth/Throat: Oropharynx is clear and moist.   Eyes: Conjunctivae are normal. Right eye exhibits no discharge. Left eye exhibits no discharge.   Neck: Normal range of motion. Neck supple.   Cardiovascular: Normal rate, regular rhythm, normal heart sounds and intact distal pulses. Exam reveals no gallop and no friction rub.    No murmur heard.  Pulmonary/Chest: Breath sounds normal. No respiratory distress. She has no wheezes. She has no rhonchi. She has no rales. She exhibits no tenderness.   Musculoskeletal: Normal range of motion.   Neurological: She is alert and oriented to person, place, and time. GCS score is 15. GCS eye subscore is 4. GCS verbal subscore is 5. GCS motor subscore is 6.   Skin: Skin is warm and dry. Capillary refill takes less than 2 seconds.   Psychiatric: She has a normal mood and affect. Thought content normal.         ED Course   Procedures  Labs Reviewed   SARS-COV-2 RNA AMPLIFICATION, QUAL          Imaging Results          X-Ray Chest AP Portable (Final result)  Result time 10/07/20 12:13:09    Final result by Joel Martins MD (10/07/20 12:13:09)                  Impression:      1. No acute chest disease.  2. Small calcified granuloma at the left lung base.      Electronically signed by: Joel Martins  Date:    10/07/2020  Time:    12:13             Narrative:    EXAMINATION:  XR CHEST AP PORTABLE    CLINICAL HISTORY:  Cough    TECHNIQUE:  Single frontal view of the chest was performed.    COMPARISON:  09/01/2020 and 12/29/2018.    FINDINGS:  Stable small calcified granuloma at the left lung base.  The right lung is clear.  No focal consolidation.    Heart size is normal.  Mediastinal contours unremarkable.  Trachea midline.    Bony thorax intact.                              X-Rays:   Independently Interpreted Readings:   Other Readings:  I have reviewed the x-ray and agree with the radiologist interpretation    Medical Decision Making:   Differential Diagnosis:   Pneumonia, viral syndrome, COVID-19, bronchitis  Clinical Tests:   Lab Tests: Ordered and Reviewed  Radiological Study: Ordered and Reviewed  ED Management:  Discussed with the patient plan of care will order labs imaging and re-evaluate patient verbalized she understood did not have any questions.    The patient is maintaining normal oxygen saturation is not tachycardic has clear and equal bilateral lung sounds no labs will be ordered on initial evaluation.    Discussed with the patient lab results as well as clinically she is positive for COVID-19 even if the swab is negative and there is a percentage of patients to show negative with swab but are actually positive for COVID discussed with the patient need for isolation as well as return to ER precautions the patient verbalized she understood she did not have any questions.    This note was created using [employer] voice recognition software that occasionally misinterpreted phrases or words.                             Clinical Impression:       ICD-10-CM ICD-9-CM   1. COVID-19 determined by clinical diagnostic criteria  U07.1 079.89   2. Cough  R05  786.2                          ED Disposition Condition    Discharge Stable        ED Prescriptions     Medication Sig Dispense Start Date End Date Auth. Provider    dexAMETHasone (DECADRON) 6 MG tablet Take 1 tablet (6 mg total) by mouth daily with breakfast. for 7 days 7 tablet 10/7/2020 10/14/2020 NICOLE Shelton        Follow-up Information     Follow up With Specialties Details Why Contact Info    Brian Pastrana MD Family Medicine  As needed [address]  [facility] [address]  [phone]      [facility] - ED Emergency Medicine  If symptoms worsen [address]  [facility] [address] [phone]                                       NICOLE Shelton  10/07/20 8535

## 2020-10-07 NOTE — DISCHARGE INSTRUCTIONS
Your blood pressure was elevated on exam today please follow up with pcp for blood pressure management.     If acute worsening of symptoms return to ER for re-evaluation.    Isolate at home for the next 2 weeks or until symptom and fever free for 72 hr.

## 2023-10-13 NOTE — ED NOTES
LASIX (FUROSEMIDE)    OFFICE VISIT  Last office visit: 6/2/22,   • per this office note patient is to continue the requested medication at the current dose & was advised to follow up in 12 months    Next office visit: 11/14/23 with Dr. Karlos Guaman    Is the patient pregnant: NO    Does patient have an appointment within the last 12 months or upcoming 3 months:  YES    Patients most recent serum creatinine in the last 12 months is normal: YES  Recent Labs   Lab 06/12/23  1011   Creatinine 0.88        Patient's most recent potassium or sodium in the last 12 months is normal: YES    Does patient have a creatinine potassium, and sodium in the last 12 months: YES  Recent Labs   Lab 06/12/23  1011   Creatinine 0.88   Potassium 4.7   Sodium 141        Most recent blood pressure in the last 12 months was: NO  • Above 130/80 for patients with diabetes, CAD or PVD, or  • Above 140/90 for all patients      Medication was:Not Refilled per protocol:  Reasoning:Patient overdue for appt. 30 day supply given to get to appt. .                F/U call to lab about blood collection needed.